# Patient Record
Sex: MALE | Race: WHITE | NOT HISPANIC OR LATINO | Employment: OTHER | URBAN - METROPOLITAN AREA
[De-identification: names, ages, dates, MRNs, and addresses within clinical notes are randomized per-mention and may not be internally consistent; named-entity substitution may affect disease eponyms.]

---

## 2023-03-04 PROBLEM — Z04.9 CONDITION NOT FOUND: Status: ACTIVE | Noted: 2023-03-04

## 2023-03-04 PROBLEM — M79.10 MYALGIA: Status: ACTIVE | Noted: 2023-03-04

## 2023-03-04 PROBLEM — M62.830 BACK MUSCLE SPASM: Status: ACTIVE | Noted: 2023-03-04

## 2023-03-04 PROBLEM — E16.2 HYPOGLYCEMIA: Status: ACTIVE | Noted: 2023-03-04

## 2023-03-04 PROBLEM — I10 HYPERTENSION: Status: ACTIVE | Noted: 2023-03-04

## 2023-03-04 PROBLEM — R79.89 ELEVATED TSH: Status: ACTIVE | Noted: 2023-03-04

## 2023-03-04 PROBLEM — G47.30 SLEEP APNEA: Status: ACTIVE | Noted: 2023-03-04

## 2023-03-04 PROBLEM — N30.90 CYSTITIS: Status: ACTIVE | Noted: 2023-03-04

## 2023-03-04 PROBLEM — E78.5 HYPERLIPIDEMIA: Status: ACTIVE | Noted: 2023-03-04

## 2023-03-04 PROBLEM — L25.5 RHUS DERMATITIS: Status: ACTIVE | Noted: 2023-03-04

## 2023-03-04 PROBLEM — R60.9 PAROTID SWELLING: Status: ACTIVE | Noted: 2023-03-04

## 2023-03-04 PROBLEM — R09.81 NASAL CONGESTION: Status: ACTIVE | Noted: 2023-03-04

## 2023-03-04 PROBLEM — R74.8 ELEVATED VITAMIN B12 LEVEL: Status: ACTIVE | Noted: 2023-03-04

## 2023-03-04 PROBLEM — E87.5 HYPERKALEMIA: Status: ACTIVE | Noted: 2023-03-04

## 2023-03-04 PROBLEM — R00.2 PALPITATIONS: Status: ACTIVE | Noted: 2023-03-04

## 2023-03-04 PROBLEM — N18.30 CKD STAGE 3 SECONDARY TO DIABETES (MULTI): Status: ACTIVE | Noted: 2023-03-04

## 2023-03-04 PROBLEM — R05.8 PRODUCTIVE COUGH: Status: ACTIVE | Noted: 2023-03-04

## 2023-03-04 PROBLEM — M25.579 JOINT PAIN, FOOT: Status: ACTIVE | Noted: 2023-03-04

## 2023-03-04 PROBLEM — G62.9 PERIPHERAL NEUROPATHY: Status: ACTIVE | Noted: 2023-03-04

## 2023-03-04 PROBLEM — R41.3 AMNESIA: Status: ACTIVE | Noted: 2023-03-04

## 2023-03-04 PROBLEM — M79.672 FOOT ARCH PAIN, LEFT: Status: ACTIVE | Noted: 2023-03-04

## 2023-03-04 PROBLEM — L84 PRE-ULCERATIVE CORN OR CALLOUS: Status: ACTIVE | Noted: 2023-03-04

## 2023-03-04 PROBLEM — E11.40 CONTROLLED DIABETES MELLITUS WITH DIABETIC NEUROPATHY (MULTI): Status: ACTIVE | Noted: 2023-03-04

## 2023-03-04 PROBLEM — E53.8 VITAMIN B12 DEFICIENCY: Status: ACTIVE | Noted: 2023-03-04

## 2023-03-04 PROBLEM — E11.22 CKD STAGE 3 SECONDARY TO DIABETES (MULTI): Status: ACTIVE | Noted: 2023-03-04

## 2023-03-04 PROBLEM — I34.0 MITRAL VALVE REGURGITATION: Status: ACTIVE | Noted: 2023-03-04

## 2023-03-04 PROBLEM — R05.9 COUGH: Status: ACTIVE | Noted: 2023-03-04

## 2023-03-04 PROBLEM — R09.89 BILATERAL CAROTID BRUITS: Status: ACTIVE | Noted: 2023-03-04

## 2023-03-04 PROBLEM — I73.9 PERIPHERAL VASCULAR DISEASE (CMS-HCC): Status: ACTIVE | Noted: 2023-03-04

## 2023-03-04 PROBLEM — R07.9 CHEST PAIN: Status: ACTIVE | Noted: 2023-03-04

## 2023-03-04 PROBLEM — N40.0 BPH WITHOUT URINARY OBSTRUCTION: Status: ACTIVE | Noted: 2023-03-04

## 2023-03-04 PROBLEM — S93.402A SPRAIN OF LEFT ANKLE: Status: ACTIVE | Noted: 2023-03-04

## 2023-03-04 PROBLEM — R10.9 ABDOMINAL PAIN OF MULTIPLE SITES: Status: ACTIVE | Noted: 2023-03-04

## 2023-03-04 PROBLEM — R06.00 DYSPNEA: Status: ACTIVE | Noted: 2023-03-04

## 2023-03-04 PROBLEM — R42 LIGHTHEADEDNESS: Status: ACTIVE | Noted: 2023-03-04

## 2023-03-04 PROBLEM — J90 PLEURAL EFFUSION: Status: ACTIVE | Noted: 2023-03-04

## 2023-03-04 PROBLEM — R04.0 EPISTAXIS: Status: ACTIVE | Noted: 2023-03-04

## 2023-03-04 PROBLEM — S39.012A LUMBAR STRAIN: Status: ACTIVE | Noted: 2023-03-04

## 2023-03-04 PROBLEM — I73.9 CLAUDICATION OF LOWER EXTREMITY (CMS-HCC): Status: ACTIVE | Noted: 2023-03-04

## 2023-03-04 PROBLEM — R79.89 ELEVATED VITAMIN B12 LEVEL: Status: ACTIVE | Noted: 2023-03-04

## 2023-03-04 PROBLEM — I25.10 CORONARY ARTERY DISEASE: Status: ACTIVE | Noted: 2023-03-04

## 2023-03-04 PROBLEM — G45.9 TIA (TRANSIENT ISCHEMIC ATTACK): Status: ACTIVE | Noted: 2023-03-04

## 2023-03-04 PROBLEM — R06.2 WHEEZING: Status: ACTIVE | Noted: 2023-03-04

## 2023-03-04 RX ORDER — BLOOD SUGAR DIAGNOSTIC
1 STRIP MISCELLANEOUS 2 TIMES DAILY
COMMUNITY
Start: 2017-09-08

## 2023-03-04 RX ORDER — ALBUTEROL SULFATE 90 UG/1
1-2 AEROSOL, METERED RESPIRATORY (INHALATION)
COMMUNITY
Start: 2021-04-09

## 2023-03-04 RX ORDER — METOPROLOL TARTRATE 25 MG/1
25 TABLET, FILM COATED ORAL 2 TIMES DAILY
COMMUNITY
Start: 2013-12-23 | End: 2023-10-06

## 2023-03-04 RX ORDER — ASPIRIN 81 MG/1
81 TABLET ORAL DAILY
COMMUNITY
Start: 2018-09-14

## 2023-03-04 RX ORDER — ATORVASTATIN CALCIUM 80 MG/1
80 TABLET, FILM COATED ORAL NIGHTLY
COMMUNITY
Start: 2016-03-16 | End: 2023-10-06

## 2023-03-04 RX ORDER — NITROGLYCERIN 0.4 MG/1
0.4 TABLET SUBLINGUAL
COMMUNITY
Start: 2016-03-30

## 2023-03-04 RX ORDER — PIOGLITAZONEHYDROCHLORIDE 30 MG/1
30 TABLET ORAL DAILY
COMMUNITY
Start: 2013-11-25 | End: 2024-01-15

## 2023-03-04 RX ORDER — TIZANIDINE 2 MG/1
2-4 TABLET ORAL EVERY 8 HOURS PRN
COMMUNITY
Start: 2021-06-02

## 2023-03-04 RX ORDER — CLOPIDOGREL BISULFATE 75 MG/1
75 TABLET ORAL DAILY
COMMUNITY
Start: 2015-09-10 | End: 2023-05-02

## 2023-03-04 RX ORDER — ACETAMINOPHEN 500 MG
100 TABLET ORAL
COMMUNITY
Start: 2020-07-07

## 2023-03-04 RX ORDER — MULTIVITAMIN
1 TABLET ORAL DAILY
COMMUNITY
Start: 2021-11-15

## 2023-03-04 RX ORDER — CILOSTAZOL 100 MG/1
100 TABLET ORAL 2 TIMES DAILY
COMMUNITY
Start: 2016-11-21

## 2023-03-04 RX ORDER — LOSARTAN POTASSIUM 50 MG/1
25 TABLET ORAL DAILY
COMMUNITY
Start: 2012-11-20 | End: 2024-03-04

## 2023-03-04 RX ORDER — FUROSEMIDE 40 MG/1
40 TABLET ORAL
COMMUNITY
Start: 2021-09-17

## 2023-03-04 RX ORDER — LANOLIN ALCOHOL/MO/W.PET/CERES
400 CREAM (GRAM) TOPICAL
COMMUNITY
Start: 2015-09-10

## 2023-03-10 ENCOUNTER — LAB (OUTPATIENT)
Dept: LAB | Facility: LAB | Age: 83
End: 2023-03-10
Payer: MEDICARE

## 2023-03-10 ENCOUNTER — OFFICE VISIT (OUTPATIENT)
Dept: PRIMARY CARE | Facility: CLINIC | Age: 83
End: 2023-03-10
Payer: MEDICARE

## 2023-03-10 VITALS
HEIGHT: 65 IN | BODY MASS INDEX: 29.99 KG/M2 | HEART RATE: 78 BPM | OXYGEN SATURATION: 94 % | TEMPERATURE: 97.3 F | SYSTOLIC BLOOD PRESSURE: 90 MMHG | DIASTOLIC BLOOD PRESSURE: 52 MMHG | WEIGHT: 180 LBS

## 2023-03-10 DIAGNOSIS — J90 PLEURAL EFFUSION: ICD-10-CM

## 2023-03-10 DIAGNOSIS — E11.22 CKD STAGE 3 SECONDARY TO DIABETES (MULTI): ICD-10-CM

## 2023-03-10 DIAGNOSIS — E11.40 CONTROLLED TYPE 2 DIABETES MELLITUS WITH DIABETIC NEUROPATHY, WITHOUT LONG-TERM CURRENT USE OF INSULIN (MULTI): ICD-10-CM

## 2023-03-10 DIAGNOSIS — I73.9 CLAUDICATION OF LOWER EXTREMITY (CMS-HCC): ICD-10-CM

## 2023-03-10 DIAGNOSIS — I73.9 PERIPHERAL VASCULAR DISEASE (CMS-HCC): ICD-10-CM

## 2023-03-10 DIAGNOSIS — N18.30 CKD STAGE 3 SECONDARY TO DIABETES (MULTI): ICD-10-CM

## 2023-03-10 DIAGNOSIS — E11.40 CONTROLLED TYPE 2 DIABETES MELLITUS WITH DIABETIC NEUROPATHY, WITHOUT LONG-TERM CURRENT USE OF INSULIN (MULTI): Primary | ICD-10-CM

## 2023-03-10 PROBLEM — R05.8 PRODUCTIVE COUGH: Status: RESOLVED | Noted: 2023-03-04 | Resolved: 2023-03-10

## 2023-03-10 PROBLEM — R04.0 EPISTAXIS: Status: RESOLVED | Noted: 2023-03-04 | Resolved: 2023-03-10

## 2023-03-10 PROBLEM — R06.2 WHEEZING: Status: RESOLVED | Noted: 2023-03-04 | Resolved: 2023-03-10

## 2023-03-10 PROCEDURE — 99214 OFFICE O/P EST MOD 30 MIN: CPT | Performed by: FAMILY MEDICINE

## 2023-03-10 PROCEDURE — 36415 COLL VENOUS BLD VENIPUNCTURE: CPT

## 2023-03-10 PROCEDURE — 84443 ASSAY THYROID STIM HORMONE: CPT

## 2023-03-10 PROCEDURE — 80053 COMPREHEN METABOLIC PANEL: CPT

## 2023-03-10 PROCEDURE — 1159F MED LIST DOCD IN RCRD: CPT | Performed by: FAMILY MEDICINE

## 2023-03-10 PROCEDURE — 83036 HEMOGLOBIN GLYCOSYLATED A1C: CPT

## 2023-03-10 PROCEDURE — 3074F SYST BP LT 130 MM HG: CPT | Performed by: FAMILY MEDICINE

## 2023-03-10 PROCEDURE — 1160F RVW MEDS BY RX/DR IN RCRD: CPT | Performed by: FAMILY MEDICINE

## 2023-03-10 PROCEDURE — 1036F TOBACCO NON-USER: CPT | Performed by: FAMILY MEDICINE

## 2023-03-10 PROCEDURE — 85025 COMPLETE CBC W/AUTO DIFF WBC: CPT

## 2023-03-10 PROCEDURE — 80061 LIPID PANEL: CPT

## 2023-03-10 PROCEDURE — 3078F DIAST BP <80 MM HG: CPT | Performed by: FAMILY MEDICINE

## 2023-03-10 ASSESSMENT — PATIENT HEALTH QUESTIONNAIRE - PHQ9
2. FEELING DOWN, DEPRESSED OR HOPELESS: NOT AT ALL
1. LITTLE INTEREST OR PLEASURE IN DOING THINGS: NOT AT ALL
SUM OF ALL RESPONSES TO PHQ9 QUESTIONS 1 AND 2: 0

## 2023-03-10 ASSESSMENT — ENCOUNTER SYMPTOMS
GASTROINTESTINAL NEGATIVE: 1
APNEA: 0
CHOKING: 0
FATIGUE: 0
CHEST TIGHTNESS: 0
OCCASIONAL FEELINGS OF UNSTEADINESS: 0
CHILLS: 0
COUGH: 1
DEPRESSION: 0
LOSS OF SENSATION IN FEET: 0
CARDIOVASCULAR NEGATIVE: 1

## 2023-03-10 NOTE — PATIENT INSTRUCTIONS
Evaluating for shortness of breath with exertion.  PA and lateral chest x-ray being performed CMP, CBC, lipids, TSH, hemoglobin A1c has been performed.    If any worsening symptoms please let me know.    May need to refer you back to pulmonary specialist regarding possible pleural effusion.    If any troubles with worsening of shortness of breath please let me know.    If any high fever or shaking chills or cough develop please let me know.    We will await the lab studies and let you know what this shows.    EKG performed and reviewed

## 2023-03-10 NOTE — PROGRESS NOTES
"Subjective   Patient ID: Nick Story is a 83 y.o. male who presents for Shortness of Breath.Had history of pleural effusion.  Now has Sob.  Dr. Bennett.  Patient having shortness of breath with activity.  Patient had significant history for pleural effusion.  Had a drain in his lung at 1 time.  About a year or 2 ago it was taken out because there was no further drainage.  He has noted some shortness of breath.  He does not have any paroxysmal nocturnal dyspnea.  He has had no rapid heart rate or slow heart rate.  There is been no swelling of the legs or feet.  Occasionally may have some lightheadedness.    He has had no fever no chills no night sweats.  No abdominal pain or discomfort.    Is had a little bit of cough no production to the cough.        HPI     Review of Systems   Constitutional:  Negative for chills and fatigue.   Respiratory:  Positive for cough. Negative for apnea, choking and chest tightness.    Cardiovascular: Negative.    Gastrointestinal: Negative.        Objective   BP 90/52   Pulse 78   Temp 36.3 °C (97.3 °F)   Ht 1.651 m (5' 5\")   Wt 81.6 kg (180 lb)   SpO2 94%   BMI 29.95 kg/m²     Physical Exam  Constitutional:       General: He is not in acute distress.     Appearance: Normal appearance. He is normal weight. He is not toxic-appearing.   HENT:      Head: Normocephalic.   Cardiovascular:      Rate and Rhythm: Normal rate and regular rhythm.      Pulses: Normal pulses.      Heart sounds: Normal heart sounds.      Comments: Grade 2/6 systolic ejection murmur.  Pulmonary:      Effort: Pulmonary effort is normal.      Breath sounds: Decreased air movement present.      Comments: Patient with decreased breath sounds in the right lung field.  Correction is decreased breath sounds noted in the left lung field.  Abdominal:      General: Abdomen is flat.   Musculoskeletal:         General: No swelling.      Cervical back: No rigidity.   Skin:     General: Skin is warm.   Neurological:      " General: No focal deficit present.      Mental Status: He is alert.      Cranial Nerves: No cranial nerve deficit.         Assessment/Plan   Problem List Items Addressed This Visit          Nervous    Controlled diabetes mellitus with diabetic neuropathy (CMS/HCC) - Primary     Evaluating for control blood sugar lab studies are being performed.            Respiratory    Pleural effusion     Concerned about pleural effusion PA and lateral chest x-ray going to be performed            Circulatory    Peripheral vascular disease (CMS/HCC)     Peripheral vascular disease has been stable.            Musculoskeletal    Claudication of lower extremity (CMS/[facility])     Claudication has been stable been walking without difficulty.            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

## 2023-03-11 LAB
ALANINE AMINOTRANSFERASE (SGPT) (U/L) IN SER/PLAS: 14 U/L (ref 10–52)
ALBUMIN (G/DL) IN SER/PLAS: 3.7 G/DL (ref 3.4–5)
ALKALINE PHOSPHATASE (U/L) IN SER/PLAS: 95 U/L (ref 33–136)
ANION GAP IN SER/PLAS: 11 MMOL/L (ref 10–20)
ASPARTATE AMINOTRANSFERASE (SGOT) (U/L) IN SER/PLAS: 28 U/L (ref 9–39)
BASOPHILS (10*3/UL) IN BLOOD BY AUTOMATED COUNT: 0.01 X10E9/L (ref 0–0.1)
BASOPHILS/100 LEUKOCYTES IN BLOOD BY AUTOMATED COUNT: 0.2 % (ref 0–2)
BILIRUBIN TOTAL (MG/DL) IN SER/PLAS: 1.5 MG/DL (ref 0–1.2)
CALCIUM (MG/DL) IN SER/PLAS: 8.9 MG/DL (ref 8.6–10.6)
CARBON DIOXIDE, TOTAL (MMOL/L) IN SER/PLAS: 28 MMOL/L (ref 21–32)
CHLORIDE (MMOL/L) IN SER/PLAS: 102 MMOL/L (ref 98–107)
CHOLESTEROL (MG/DL) IN SER/PLAS: 146 MG/DL (ref 0–199)
CHOLESTEROL IN HDL (MG/DL) IN SER/PLAS: 41.6 MG/DL
CHOLESTEROL/HDL RATIO: 3.5
CREATININE (MG/DL) IN SER/PLAS: 1.79 MG/DL (ref 0.5–1.3)
EOSINOPHILS (10*3/UL) IN BLOOD BY AUTOMATED COUNT: 0.03 X10E9/L (ref 0–0.4)
EOSINOPHILS/100 LEUKOCYTES IN BLOOD BY AUTOMATED COUNT: 0.5 % (ref 0–6)
ERYTHROCYTE DISTRIBUTION WIDTH (RATIO) BY AUTOMATED COUNT: 16.6 % (ref 11.5–14.5)
ERYTHROCYTE MEAN CORPUSCULAR HEMOGLOBIN CONCENTRATION (G/DL) BY AUTOMATED: 30.9 G/DL (ref 32–36)
ERYTHROCYTE MEAN CORPUSCULAR VOLUME (FL) BY AUTOMATED COUNT: 114 FL (ref 80–100)
ERYTHROCYTES (10*6/UL) IN BLOOD BY AUTOMATED COUNT: 3.19 X10E12/L (ref 4.5–5.9)
ESTIMATED AVERAGE GLUCOSE FOR HBA1C: 111 MG/DL
GFR MALE: 37 ML/MIN/1.73M2
GLUCOSE (MG/DL) IN SER/PLAS: 115 MG/DL (ref 74–99)
HEMATOCRIT (%) IN BLOOD BY AUTOMATED COUNT: 36.3 % (ref 41–52)
HEMOGLOBIN (G/DL) IN BLOOD: 11.2 G/DL (ref 13.5–17.5)
HEMOGLOBIN A1C/HEMOGLOBIN TOTAL IN BLOOD: 5.5 %
IMMATURE GRANULOCYTES/100 LEUKOCYTES IN BLOOD BY AUTOMATED COUNT: 0.3 % (ref 0–0.9)
LDL: 92 MG/DL (ref 0–99)
LEUKOCYTES (10*3/UL) IN BLOOD BY AUTOMATED COUNT: 6 X10E9/L (ref 4.4–11.3)
LYMPHOCYTES (10*3/UL) IN BLOOD BY AUTOMATED COUNT: 0.31 X10E9/L (ref 0.8–3)
LYMPHOCYTES/100 LEUKOCYTES IN BLOOD BY AUTOMATED COUNT: 5.2 % (ref 13–44)
MONOCYTES (10*3/UL) IN BLOOD BY AUTOMATED COUNT: 0.43 X10E9/L (ref 0.05–0.8)
MONOCYTES/100 LEUKOCYTES IN BLOOD BY AUTOMATED COUNT: 7.2 % (ref 2–10)
NEUTROPHILS (10*3/UL) IN BLOOD BY AUTOMATED COUNT: 5.18 X10E9/L (ref 1.6–5.5)
NEUTROPHILS/100 LEUKOCYTES IN BLOOD BY AUTOMATED COUNT: 86.6 % (ref 40–80)
NRBC (PER 100 WBCS) BY AUTOMATED COUNT: 0 /100 WBC (ref 0–0)
PLATELETS (10*3/UL) IN BLOOD AUTOMATED COUNT: 149 X10E9/L (ref 150–450)
POTASSIUM (MMOL/L) IN SER/PLAS: 5.2 MMOL/L (ref 3.5–5.3)
PROTEIN TOTAL: 7.2 G/DL (ref 6.4–8.2)
SODIUM (MMOL/L) IN SER/PLAS: 136 MMOL/L (ref 136–145)
THYROTROPIN (MIU/L) IN SER/PLAS BY DETECTION LIMIT <= 0.05 MIU/L: 3 MIU/L (ref 0.44–3.98)
TRIGLYCERIDE (MG/DL) IN SER/PLAS: 62 MG/DL (ref 0–149)
UREA NITROGEN (MG/DL) IN SER/PLAS: 30 MG/DL (ref 6–23)
VLDL: 12 MG/DL (ref 0–40)

## 2023-03-13 DIAGNOSIS — D64.9 LOW HEMOGLOBIN: Primary | ICD-10-CM

## 2023-03-14 ENCOUNTER — LAB (OUTPATIENT)
Dept: LAB | Facility: LAB | Age: 83
End: 2023-03-14
Payer: MEDICARE

## 2023-03-14 DIAGNOSIS — D64.9 LOW HEMOGLOBIN: ICD-10-CM

## 2023-03-14 LAB
ALANINE AMINOTRANSFERASE (SGPT) (U/L) IN SER/PLAS: NORMAL
ALBUMIN (G/DL) IN SER/PLAS: NORMAL
ALKALINE PHOSPHATASE (U/L) IN SER/PLAS: NORMAL
ANION GAP IN SER/PLAS: NORMAL
ASPARTATE AMINOTRANSFERASE (SGOT) (U/L) IN SER/PLAS: NORMAL
BASOPHILS (10*3/UL) IN BLOOD BY AUTOMATED COUNT: NORMAL
BASOPHILS/100 LEUKOCYTES IN BLOOD BY AUTOMATED COUNT: NORMAL
BILIRUBIN TOTAL (MG/DL) IN SER/PLAS: NORMAL
CALCIUM (MG/DL) IN SER/PLAS: NORMAL
CARBON DIOXIDE, TOTAL (MMOL/L) IN SER/PLAS: NORMAL
CHLORIDE (MMOL/L) IN SER/PLAS: NORMAL
CHOLESTEROL (MG/DL) IN SER/PLAS: NORMAL
CHOLESTEROL IN HDL (MG/DL) IN SER/PLAS: NORMAL
CHOLESTEROL/HDL RATIO: NORMAL
CREATININE (MG/DL) IN SER/PLAS: NORMAL
EOSINOPHILS (10*3/UL) IN BLOOD BY AUTOMATED COUNT: NORMAL
EOSINOPHILS/100 LEUKOCYTES IN BLOOD BY AUTOMATED COUNT: NORMAL
ERYTHROCYTE DISTRIBUTION WIDTH (RATIO) BY AUTOMATED COUNT: NORMAL
ERYTHROCYTE MEAN CORPUSCULAR HEMOGLOBIN CONCENTRATION (G/DL) BY AUTOMATED: NORMAL
ERYTHROCYTE MEAN CORPUSCULAR VOLUME (FL) BY AUTOMATED COUNT: NORMAL
ERYTHROCYTES (10*6/UL) IN BLOOD BY AUTOMATED COUNT: NORMAL
ESTIMATED AVERAGE GLUCOSE FOR HBA1C: NORMAL
GFR FEMALE: NORMAL
GFR MALE: NORMAL
GLUCOSE (MG/DL) IN SER/PLAS: NORMAL
HEMATOCRIT (%) IN BLOOD BY AUTOMATED COUNT: NORMAL
HEMOGLOBIN (G/DL) IN BLOOD: NORMAL
HEMOGLOBIN A1C/HEMOGLOBIN TOTAL IN BLOOD: NORMAL
HGB A1C-DATA CONVERSION: NORMAL %
IMMATURE GRANULOCYTES/100 LEUKOCYTES IN BLOOD BY AUTOMATED COUNT: NORMAL
LDL: NORMAL
LEUKOCYTES (10*3/UL) IN BLOOD BY AUTOMATED COUNT: NORMAL
LYMPHOCYTES (10*3/UL) IN BLOOD BY AUTOMATED COUNT: NORMAL
LYMPHOCYTES/100 LEUKOCYTES IN BLOOD BY AUTOMATED COUNT: NORMAL
MANUAL DIFFERENTIAL Y/N: NORMAL
MONOCYTES (10*3/UL) IN BLOOD BY AUTOMATED COUNT: NORMAL
MONOCYTES/100 LEUKOCYTES IN BLOOD BY AUTOMATED COUNT: NORMAL
NEUTROPHILS (10*3/UL) IN BLOOD BY AUTOMATED COUNT: NORMAL
NEUTROPHILS/100 LEUKOCYTES IN BLOOD BY AUTOMATED COUNT: NORMAL
NON HDL CHOLESTEROL: NORMAL
NRBC (PER 100 WBCS) BY AUTOMATED COUNT: NORMAL
PLATELETS (10*3/UL) IN BLOOD AUTOMATED COUNT: NORMAL
POTASSIUM (MMOL/L) IN SER/PLAS: NORMAL
PROTEIN TOTAL: NORMAL
SODIUM (MMOL/L) IN SER/PLAS: NORMAL
THYROTROPIN (MIU/L) IN SER/PLAS BY DETECTION LIMIT <= 0.05 MIU/L: NORMAL
TRIGLYCERIDE (MG/DL) IN SER/PLAS: NORMAL
UREA NITROGEN (MG/DL) IN SER/PLAS: NORMAL
VLDL: NORMAL

## 2023-03-14 PROCEDURE — 82728 ASSAY OF FERRITIN: CPT

## 2023-03-14 PROCEDURE — 36415 COLL VENOUS BLD VENIPUNCTURE: CPT

## 2023-03-14 PROCEDURE — 83540 ASSAY OF IRON: CPT

## 2023-03-14 PROCEDURE — 85045 AUTOMATED RETICULOCYTE COUNT: CPT

## 2023-03-14 PROCEDURE — 83550 IRON BINDING TEST: CPT

## 2023-03-14 PROCEDURE — 82607 VITAMIN B-12: CPT

## 2023-03-15 ENCOUNTER — TELEPHONE (OUTPATIENT)
Dept: PRIMARY CARE | Facility: CLINIC | Age: 83
End: 2023-03-15
Payer: MEDICARE

## 2023-03-15 LAB
COBALAMIN (VITAMIN B12) (PG/ML) IN SER/PLAS: 551 PG/ML (ref 211–911)
FERRITIN (UG/LL) IN SER/PLAS: 326 UG/L (ref 20–300)
HEMOGLOBIN (PG) IN RETICULOCYTES: 38 PG (ref 28–38)
IMMATURE RETIC FRACTION: 18.5 % (ref 0–16)
IRON (UG/DL) IN SER/PLAS: 109 UG/DL (ref 35–150)
IRON BINDING CAPACITY (UG/DL) IN SER/PLAS: 248 UG/DL (ref 240–445)
IRON SATURATION (%) IN SER/PLAS: 44 % (ref 25–45)
PROSTATE SPECIFIC ANTIGEN,SCREEN: 1.27 NG/ML (ref 0–4)
RETICULOCYTES (10*3/UL) IN BLOOD: 0.07 X10E12/L (ref 0.02–0.11)
RETICULOCYTES/100 ERYTHROCYTES IN BLOOD BY AUTOMATED COUNT: 2.1 % (ref 0.5–2)

## 2023-03-15 NOTE — TELEPHONE ENCOUNTER
Pt daughter asking what labs were low that pt had to have repeated and why the labs that were drawn Monday were canceled     Asking for a phone call

## 2023-03-15 NOTE — TELEPHONE ENCOUNTER
I did reach out to the daughter and left a message that her studies actually were performed.  The iron levels showed no evidence of iron deficiency.  B12 showed no evidence of deficiency.  Reticulocyte count is elevated which means it is working well.    The other areas that were canceled were not ordered.    Instructed to call back if any further questions.

## 2023-03-21 DIAGNOSIS — E11.40 CONTROLLED TYPE 2 DIABETES MELLITUS WITH DIABETIC NEUROPATHY, WITHOUT LONG-TERM CURRENT USE OF INSULIN (MULTI): ICD-10-CM

## 2023-03-30 ENCOUNTER — LAB (OUTPATIENT)
Dept: LAB | Facility: LAB | Age: 83
End: 2023-03-30
Payer: MEDICARE

## 2023-03-30 DIAGNOSIS — D64.9 LOW HEMOGLOBIN: ICD-10-CM

## 2023-03-30 LAB
BASOPHILS (10*3/UL) IN BLOOD BY AUTOMATED COUNT: 0.02 X10E9/L (ref 0–0.1)
BASOPHILS/100 LEUKOCYTES IN BLOOD BY AUTOMATED COUNT: 0.5 % (ref 0–2)
EOSINOPHILS (10*3/UL) IN BLOOD BY AUTOMATED COUNT: 0.07 X10E9/L (ref 0–0.4)
EOSINOPHILS/100 LEUKOCYTES IN BLOOD BY AUTOMATED COUNT: 1.6 % (ref 0–6)
ERYTHROCYTE DISTRIBUTION WIDTH (RATIO) BY AUTOMATED COUNT: 16.3 % (ref 11.5–14.5)
ERYTHROCYTE MEAN CORPUSCULAR HEMOGLOBIN CONCENTRATION (G/DL) BY AUTOMATED: 31.3 G/DL (ref 32–36)
ERYTHROCYTE MEAN CORPUSCULAR VOLUME (FL) BY AUTOMATED COUNT: 113 FL (ref 80–100)
ERYTHROCYTES (10*6/UL) IN BLOOD BY AUTOMATED COUNT: 3.25 X10E12/L (ref 4.5–5.9)
HEMATOCRIT (%) IN BLOOD BY AUTOMATED COUNT: 36.8 % (ref 41–52)
HEMOGLOBIN (G/DL) IN BLOOD: 11.5 G/DL (ref 13.5–17.5)
IMMATURE GRANULOCYTES/100 LEUKOCYTES IN BLOOD BY AUTOMATED COUNT: 0.7 % (ref 0–0.9)
LEUKOCYTES (10*3/UL) IN BLOOD BY AUTOMATED COUNT: 4.4 X10E9/L (ref 4.4–11.3)
LYMPHOCYTES (10*3/UL) IN BLOOD BY AUTOMATED COUNT: 0.56 X10E9/L (ref 0.8–3)
LYMPHOCYTES/100 LEUKOCYTES IN BLOOD BY AUTOMATED COUNT: 12.8 % (ref 13–44)
MONOCYTES (10*3/UL) IN BLOOD BY AUTOMATED COUNT: 0.43 X10E9/L (ref 0.05–0.8)
MONOCYTES/100 LEUKOCYTES IN BLOOD BY AUTOMATED COUNT: 9.8 % (ref 2–10)
NEUTROPHILS (10*3/UL) IN BLOOD BY AUTOMATED COUNT: 3.28 X10E9/L (ref 1.6–5.5)
NEUTROPHILS/100 LEUKOCYTES IN BLOOD BY AUTOMATED COUNT: 74.6 % (ref 40–80)
NRBC (PER 100 WBCS) BY AUTOMATED COUNT: 0 /100 WBC (ref 0–0)
PLATELETS (10*3/UL) IN BLOOD AUTOMATED COUNT: 126 X10E9/L (ref 150–450)

## 2023-03-30 PROCEDURE — 85025 COMPLETE CBC W/AUTO DIFF WBC: CPT

## 2023-03-30 PROCEDURE — 36415 COLL VENOUS BLD VENIPUNCTURE: CPT

## 2023-04-03 ENCOUNTER — OFFICE VISIT (OUTPATIENT)
Dept: PRIMARY CARE | Facility: CLINIC | Age: 83
End: 2023-04-03
Payer: MEDICARE

## 2023-04-03 VITALS
HEART RATE: 76 BPM | DIASTOLIC BLOOD PRESSURE: 68 MMHG | OXYGEN SATURATION: 97 % | TEMPERATURE: 97.5 F | HEIGHT: 65 IN | WEIGHT: 180 LBS | BODY MASS INDEX: 29.99 KG/M2 | SYSTOLIC BLOOD PRESSURE: 120 MMHG

## 2023-04-03 DIAGNOSIS — Z00.00 ROUTINE GENERAL MEDICAL EXAMINATION AT HEALTH CARE FACILITY: ICD-10-CM

## 2023-04-03 DIAGNOSIS — D64.9 ANEMIA, UNSPECIFIED TYPE: ICD-10-CM

## 2023-04-03 DIAGNOSIS — J90 PLEURAL EFFUSION: ICD-10-CM

## 2023-04-03 DIAGNOSIS — I25.10 CORONARY ARTERY DISEASE INVOLVING NATIVE HEART WITHOUT ANGINA PECTORIS, UNSPECIFIED VESSEL OR LESION TYPE: ICD-10-CM

## 2023-04-03 DIAGNOSIS — Z00.00 ENCOUNTER FOR ANNUAL WELLNESS VISIT (AWV) IN MEDICARE PATIENT: Primary | ICD-10-CM

## 2023-04-03 PROCEDURE — 1160F RVW MEDS BY RX/DR IN RCRD: CPT | Performed by: FAMILY MEDICINE

## 2023-04-03 PROCEDURE — 1159F MED LIST DOCD IN RCRD: CPT | Performed by: FAMILY MEDICINE

## 2023-04-03 PROCEDURE — G0439 PPPS, SUBSEQ VISIT: HCPCS | Performed by: FAMILY MEDICINE

## 2023-04-03 PROCEDURE — 1170F FXNL STATUS ASSESSED: CPT | Performed by: FAMILY MEDICINE

## 2023-04-03 PROCEDURE — 99214 OFFICE O/P EST MOD 30 MIN: CPT | Performed by: FAMILY MEDICINE

## 2023-04-03 PROCEDURE — 1036F TOBACCO NON-USER: CPT | Performed by: FAMILY MEDICINE

## 2023-04-03 PROCEDURE — 3078F DIAST BP <80 MM HG: CPT | Performed by: FAMILY MEDICINE

## 2023-04-03 PROCEDURE — 3074F SYST BP LT 130 MM HG: CPT | Performed by: FAMILY MEDICINE

## 2023-04-03 ASSESSMENT — ENCOUNTER SYMPTOMS
EYE DISCHARGE: 0
NAUSEA: 0
BACK PAIN: 0
DYSURIA: 0
WHEEZING: 0
BLOOD IN STOOL: 0
COUGH: 1
DIZZINESS: 0
EYE PAIN: 0
CONSTIPATION: 0
ABDOMINAL DISTENTION: 0
CONSTITUTIONAL NEGATIVE: 1
DEPRESSION: 0
ABDOMINAL PAIN: 0
COLOR CHANGE: 0
CHEST TIGHTNESS: 0
ARTHRALGIAS: 0
POLYPHAGIA: 0
AGITATION: 0
OCCASIONAL FEELINGS OF UNSTEADINESS: 0
FLANK PAIN: 0
SHORTNESS OF BREATH: 1
LIGHT-HEADEDNESS: 0
LOSS OF SENSATION IN FEET: 0
POLYDIPSIA: 0
RECTAL PAIN: 0

## 2023-04-03 ASSESSMENT — ACTIVITIES OF DAILY LIVING (ADL)
BATHING: INDEPENDENT
DRESSING: INDEPENDENT
GROCERY_SHOPPING: INDEPENDENT
DOING_HOUSEWORK: INDEPENDENT
MANAGING_FINANCES: INDEPENDENT
TAKING_MEDICATION: INDEPENDENT

## 2023-04-03 NOTE — PATIENT INSTRUCTIONS
Overall stable.  Going to have you see nephrology regarding elevation of kidney function test.    Going to have you see hematology regarding anemia.    Please continue to follow-up with vascular specialist as noted.    Reviewed labs with you today medications reviewed and reconciled.

## 2023-04-03 NOTE — PROGRESS NOTES
Subjective   Patient ID: Nick Story is a 83 y.o. male who presents for Medicare Annual Wellness Visit Subsequent (physical).    HPI patient presents for follow-up.  Patient has shortness of breath with exertion.  Patient has met with thoracic specialist.  They felt that his effusion was stable.  He did not want to do thoracentesis at this time but may need to do so.  They did want to do evaluation of the heart valves and they did order an echocardiogram.    He will be following up with family and has follow-up with echo.  Blood counts showed significant macrocytosis B12 level is normal.  Anemia was appreciated and thrombocytopenia was noted.    He had no troubles with bleeding no blood in the stool or black tarry stool.  He has had no troubles with abdominal pain or discomfort.  He is eating and drinking without difficulty.  Shortness of breath remains from his pleural effusion.  PSA level was normal    Seeing seeing kidney specialist, patient noted to have anemia.  Iron and B12 levels are normal.  His denies any blood in the stool or black tarry stool.  Otherwise has been doing well states breathing is about the same.  Jorge did meet with the pulmonary or thoracic specialist who did not recommend doing thoracentesis at this time.  Felt that things were stable.  They have ordered echocardiogram to be performed.    He has had no troubles with nausea or vomiting.  No numbness no tingling the legs or feet.    Alcohol intake: none  Caffeine intake: none  Exercise: none    Last Colonoscopy: not recent  Last Pap smear: n/A  Mammogram:  Last Dexa scan:N/a    Shingles vaccine: None  TdaP vaccine:     Review of Systems   Constitutional: Negative.    HENT: Negative.     Eyes:  Negative for photophobia, pain and discharge.   Respiratory:  Positive for cough and shortness of breath. Negative for chest tightness and wheezing.    Cardiovascular:  Negative for chest pain.   Gastrointestinal:  Negative for abdominal  "distention, abdominal pain, blood in stool, constipation, nausea and rectal pain.   Endocrine: Positive for cold intolerance. Negative for polydipsia and polyphagia.   Genitourinary:  Negative for dysuria, flank pain, genital sores and urgency.   Musculoskeletal:  Negative for arthralgias and back pain.   Skin:  Negative for color change and rash.   Allergic/Immunologic: Negative for food allergies.   Neurological:  Negative for dizziness, syncope and light-headedness.   Psychiatric/Behavioral:  Negative for agitation and decreased concentration.        Objective   /68   Pulse 76   Temp 36.4 °C (97.5 °F)   Ht 1.651 m (5' 5\")   Wt 81.6 kg (180 lb)   SpO2 97%   BMI 29.95 kg/m²   BSA Body surface area is 1.93 meters squared.      Physical Exam  Constitutional:       Appearance: Normal appearance. He is obese.   HENT:      Head: Normocephalic and atraumatic.      Right Ear: Tympanic membrane normal.      Left Ear: Tympanic membrane normal.      Nose: Nose normal.      Mouth/Throat:      Mouth: Mucous membranes are dry.   Eyes:      Pupils: Pupils are equal, round, and reactive to light.   Cardiovascular:      Rate and Rhythm: Normal rate.      Heart sounds: Murmur heard.      Comments: Grade 2/6 systolic ejection murmur  Pulmonary:      Effort: Pulmonary effort is normal.      Comments: Decreased breath sounds in the bases of the lung  Abdominal:      General: Abdomen is flat. There is no distension.      Palpations: There is no mass.   Genitourinary:     Penis: Normal.       Testes: Normal.   Musculoskeletal:         General: Normal range of motion.      Cervical back: Normal range of motion.   Neurological:      General: No focal deficit present.      Mental Status: He is alert.      Cranial Nerves: No cranial nerve deficit.   Psychiatric:         Mood and Affect: Mood normal.       Lab on 03/30/2023   Component Date Value Ref Range Status    WBC 03/30/2023 4.4  4.4 - 11.3 x10E9/L Final    nRBC 03/30/2023 " 0.0  0.0 - 0.0 /100 WBC Final    RBC 03/30/2023 3.25 (L)  4.50 - 5.90 x10E12/L Final    Hemoglobin 03/30/2023 11.5 (L)  13.5 - 17.5 g/dL Final    Hematocrit 03/30/2023 36.8 (L)  41.0 - 52.0 % Final    MCV 03/30/2023 113 (H)  80 - 100 fL Final    MCHC 03/30/2023 31.3 (L)  32.0 - 36.0 g/dL Final    Platelets 03/30/2023 126 (L)  150 - 450 x10E9/L Final    RDW 03/30/2023 16.3 (H)  11.5 - 14.5 % Final    Neutrophils % 03/30/2023 74.6  40.0 - 80.0 % Final    Immature Granulocytes %, Automated 03/30/2023 0.7  0.0 - 0.9 % Final     Immature Granulocyte Count (IG) includes promyelocytes,    myelocytes and metamyelocytes but does not include bands.   Percent differential counts (%) should be interpreted in the   context of the absolute cell counts (cells/L).    Lymphocytes % 03/30/2023 12.8  13.0 - 44.0 % Final    Monocytes % 03/30/2023 9.8  2.0 - 10.0 % Final    Eosinophils % 03/30/2023 1.6  0.0 - 6.0 % Final    Basophils % 03/30/2023 0.5  0.0 - 2.0 % Final    Neutrophils Absolute 03/30/2023 3.28  1.60 - 5.50 x10E9/L Final    Lymphocytes Absolute 03/30/2023 0.56 (L)  0.80 - 3.00 x10E9/L Final    Monocytes Absolute 03/30/2023 0.43  0.05 - 0.80 x10E9/L Final    Eosinophils Absolute 03/30/2023 0.07  0.00 - 0.40 x10E9/L Final    Basophils Absolute 03/30/2023 0.02  0.00 - 0.10 x10E9/L Final   Orders Only on 03/14/2023   Component Date Value Ref Range Status    Prostate Specific Antigen,Screen 03/14/2023 1.27  0.00 - 4.00 ng/mL Final    Comment: The FDA requires that the method used for PSA assay be   reported to the physician. Values obtained with different   assay methods must not be used interchangeably. This test   was performed at [facility] using the Siemens  AdMobilize PSA method, which is a sandwich immunoassay using   chemiluminescence for quantitation. The assay is approved  for measurement of prostate-specific antigen (PSA) in   serum and may be used in conjunction with a digital rectal  examination in  men 50 years and older as an aid in   detection of prostate cancer.   5-Alpha-reductase inhibitors (e.g. Proscar, Finasteride,   Avodart, Dutasteride and Altagracia) for the treatment of BPH   have been shown to lower PSA levels by an average of 50%   after 6 months of treatment.     Orders Only on 03/14/2023   Component Date Value Ref Range Status    Cholesterol 03/14/2023 CANCELED   Final-Edited    Comment: .      AGE      DESIRABLE   BORDERLINE HIGH   HIGH     0-19 Y     0 - 169       170 - 199     >/= 200    20-24 Y     0 - 189       190 - 224     >/= 225         >24 Y     0 - 199       200 - 239     >/= 240   **All ranges are based on fasting samples. Specific   therapeutic targets will vary based on patient-specific   cardiac risk.  .   Pediatric guidelines reference:Pediatrics 2011, 128(S5).   Adult guidelines reference: NCEP ATPIII Guidelines,     LAUREANO 2001, 258:7126-97  .   Venipuncture immediately after or during the    administration of Metamizole may lead to falsely   low results. Testing should be performed immediately   prior to Metamizole dosing.    Result canceled by the ancillary.      HDL 03/14/2023 CANCELED   Final-Edited    Comment: .      AGE      VERY LOW   LOW     NORMAL    HIGH       0-19 Y       < 35   < 40     40-45     ----    20-24 Y       ----   < 40       >45     ----      >24 Y       ----   < 40     40-60      >60  .    Result canceled by the ancillary.      Cholesterol/HDL Ratio 03/14/2023 CANCELED   Final-Edited    Result canceled by the ancillary.    LDL 03/14/2023 CANCELED   Final-Edited    Comment: .                           NEAR      BORD      AGE      DESIRABLE  OPTIMAL    HIGH     HIGH     VERY HIGH     0-19 Y     0 - 109     ---    110-129   >/= 130     ----    20-24 Y     0 - 119     ---    120-159   >/= 160     ----      >24 Y     0 -  99   100-129  130-159   160-189     >/=190  .    Result canceled by the ancillary.      VLDL 03/14/2023 CANCELED   Final-Edited    Result  canceled by the ancillary.    Triglycerides 03/14/2023 CANCELED   Final-Edited    Comment: .      AGE      DESIRABLE   BORDERLINE HIGH   HIGH     VERY HIGH   0 D-90 D    19 - 174         ----         ----        ----  91 D- 9 Y     0 -  74        75 -  99     >/= 100      ----    10-19 Y     0 -  89        90 - 129     >/= 130      ----    20-24 Y     0 - 114       115 - 149     >/= 150      ----         >24 Y     0 - 149       150 - 199    200- 499    >/= 500  .   Venipuncture immediately after or during the    administration of Metamizole may lead to falsely   low results. Testing should be performed immediately   prior to Metamizole dosing.    Result canceled by the ancillary.      Non HDL Cholesterol 03/14/2023 CANCELED   Final-Edited    Comment:     AGE      DESIRABLE   BORDERLINE HIGH   HIGH     VERY HIGH     0-19 Y     0 - 119       120 - 144     >/= 145    >/= 160    20-24 Y     0 - 149       150 - 189     >/= 190      ----         >24 Y    30 MG/DL ABOVE LDL CHOLESTEROL GOAL  .    Result canceled by the ancillary.     Orders Only on 03/14/2023   Component Date Value Ref Range Status    TSH 03/14/2023 CANCELED   Final-Edited    Comment:  TSH testing is performed using different testing    methodology at [facility] than at other    [facility]. Direct result comparisons should    only be made within the same method.    Result canceled by the ancillary.     Orders Only on 03/14/2023   Component Date Value Ref Range Status    Glucose 03/14/2023 CANCELED   Final-Edited    Result canceled by the ancillary.    Sodium 03/14/2023 CANCELED   Final-Edited    Result canceled by the ancillary.    Potassium 03/14/2023 CANCELED   Final-Edited    Result canceled by the ancillary.    Chloride 03/14/2023 CANCELED   Final-Edited    Result canceled by the ancillary.    Bicarbonate 03/14/2023 CANCELED   Final-Edited    Result canceled by the ancillary.    Anion Gap 03/14/2023 CANCELED   Final-Edited    Result  canceled by the ancillary.    Urea Nitrogen 03/14/2023 CANCELED   Final-Edited    Result canceled by the ancillary.    Creatinine 03/14/2023 CANCELED   Final-Edited    Result canceled by the ancillary.    GFR Female 03/14/2023 CANCELED   Final-Edited    Comment:  CALCULATIONS OF ESTIMATED GFR ARE PERFORMED   USING THE 2021 CKD-EPI STUDY REFIT EQUATION   WITHOUT THE RACE VARIABLE FOR THE IDMS-TRACEABLE   CREATININE METHODS.    https://jasn.asnjournals.org/content/early/2021/09/22/ASN.[email or web address]    Result canceled by the ancillary.      GFR MALE 03/14/2023 CANCELED   Final-Edited    Comment:  CALCULATIONS OF ESTIMATED GFR ARE PERFORMED   USING THE 2021 CKD-EPI STUDY REFIT EQUATION   WITHOUT THE RACE VARIABLE FOR THE IDMS-TRACEABLE   CREATININE METHODS.    https://jasn.asnjournals.org/content/early/2021/09/22/ASN.[email or web address]    Result canceled by the ancillary.      Calcium 03/14/2023 CANCELED   Final-Edited    Result canceled by the ancillary.    Albumin 03/14/2023 CANCELED   Final-Edited    Result canceled by the ancillary.    Alkaline Phosphatase 03/14/2023 CANCELED   Final-Edited    Result canceled by the ancillary.    Total Protein 03/14/2023 CANCELED   Final-Edited    Result canceled by the ancillary.    AST 03/14/2023 CANCELED   Final-Edited    Result canceled by the ancillary.    Total Bilirubin 03/14/2023 CANCELED   Final-Edited    Result canceled by the ancillary.    ALT (SGPT) 03/14/2023 CANCELED   Final-Edited    Comment:  Patients treated with Sulfasalazine may generate    falsely decreased results for ALT.    Result canceled by the ancillary.     Orders Only on 03/14/2023   Component Date Value Ref Range Status    Hemoglobin A1C 03/14/2023 CANCELED   Final-Edited    Comment:      Diagnosis of Diabetes-Adults   Non-Diabetic: < or = 5.6%   Increased risk for developing diabetes: 5.7-6.4%   Diagnostic of diabetes: > or = 6.5%  .       Monitoring of Diabetes                Age (y)     Therapeutic Goal (%)    Adults:          >18           <7.0   Pediatrics:    13-18           <7.5                   7-12           <8.0                   0- 6            7.5-8.5   American Diabetes Association. Diabetes Care 33(S1), Jan 2010.    Result canceled by the ancillary.      Estimated Average Glucose 03/14/2023 CANCELED   Final-Edited    Result canceled by the ancillary.    HGB A1C 03/14/2023 CANCELED  % Final-Edited    Comment:       Diagnosis of Diabetes-Adults   Non-Diabetic: < or = 5.6%   Increased risk for developing diabetes: 5.7-6.4%   Diagnostic of diabetes: > or = 6.5%  .        Monitoring of Diabetes                Age (y)     Therapeutic Goal (%)   Adults:          >18           <7.0   Pediatrics:    13-18           <7.5                   7-12           <8.0                   0- 6            7.5-8.5   American Diabetes Association. Diabetes Care 33(S1), Jan 2010.    Result canceled by the ancillary.     Orders Only on 03/14/2023   Component Date Value Ref Range Status    WBC 03/14/2023 CANCELED   Final-Edited    Result canceled by the ancillary.    nRBC 03/14/2023 CANCELED   Final-Edited    Result canceled by the ancillary.    RBC 03/14/2023 CANCELED   Final-Edited    Result canceled by the ancillary.    Hemoglobin 03/14/2023 CANCELED   Final-Edited    Result canceled by the ancillary.    Hematocrit 03/14/2023 CANCELED   Final-Edited    Result canceled by the ancillary.    MCV 03/14/2023 CANCELED   Final-Edited    Result canceled by the ancillary.    MCHC 03/14/2023 CANCELED   Final-Edited    Result canceled by the ancillary.    Platelets 03/14/2023 CANCELED   Final-Edited    Result canceled by the ancillary.    RDW 03/14/2023 CANCELED   Final-Edited    Result canceled by the ancillary.    Neutrophils % 03/14/2023 CANCELED   Final-Edited    Result canceled by the ancillary.    Immature Granulocytes %, Automated 03/14/2023 CANCELED   Final-Edited    Comment:  Immature Granulocyte Count (IG) includes promyelocytes,     myelocytes and metamyelocytes but does not include bands.   Percent differential counts (%) should be interpreted in the   context of the absolute cell counts (cells/L).    Result canceled by the ancillary.      Lymphocytes % 03/14/2023 CANCELED   Final-Edited    Result canceled by the ancillary.    Monocytes % 03/14/2023 CANCELED   Final-Edited    Result canceled by the ancillary.    Eosinophils % 03/14/2023 CANCELED   Final-Edited    Result canceled by the ancillary.    Basophils % 03/14/2023 CANCELED   Final-Edited    Result canceled by the ancillary.    Neutrophils Absolute 03/14/2023 CANCELED   Final-Edited    Result canceled by the ancillary.    Lymphocytes Absolute 03/14/2023 CANCELED   Final-Edited    Result canceled by the ancillary.    Monocytes Absolute 03/14/2023 CANCELED   Final-Edited    Result canceled by the ancillary.    Eosinophils Absolute 03/14/2023 CANCELED   Final-Edited    Result canceled by the ancillary.    Basophils Absolute 03/14/2023 CANCELED   Final-Edited    Result canceled by the ancillary.    MANUAL DIFFERENTIAL Y/N 03/14/2023 CANCELED   Final-Edited    Result canceled by the ancillary.   Lab on 03/14/2023   Component Date Value Ref Range Status    Iron 03/14/2023 109  35 - 150 ug/dL Final    TIBC 03/14/2023 248  240 - 445 ug/dL Final    Iron Saturation 03/14/2023 44  25 - 45 % Final    Ferritin 03/14/2023 326 (H)  20 - 300 ug/L Final    Retic % 03/14/2023 2.1 (H)  0.5 - 2.0 % Final    Retic Absolute 03/14/2023 0.075  0.017 - 0.110 x10E12/L Final    Immature Retic fraction 03/14/2023 18.5 (H)  0.0 - 16.0 % Final    Reticulocyte Hemoglobin 03/14/2023 38  28 - 38 pg Final    Vitamin B-12 03/14/2023 551  211 - 911 pg/mL Final   Lab on 03/10/2023   Component Date Value Ref Range Status    WBC 03/10/2023 6.0  4.4 - 11.3 x10E9/L Final    nRBC 03/10/2023 0.0  0.0 - 0.0 /100 WBC Final    RBC 03/10/2023 3.19 (L)  4.50 - 5.90 x10E12/L Final    Hemoglobin 03/10/2023 11.2 (L)  13.5 - 17.5 g/dL  Final    Hematocrit 03/10/2023 36.3 (L)  41.0 - 52.0 % Final    MCV 03/10/2023 114 (H)  80 - 100 fL Final    MCHC 03/10/2023 30.9 (L)  32.0 - 36.0 g/dL Final    Platelets 03/10/2023 149 (L)  150 - 450 x10E9/L Final    RDW 03/10/2023 16.6 (H)  11.5 - 14.5 % Final    Neutrophils % 03/10/2023 86.6  40.0 - 80.0 % Final    Immature Granulocytes %, Automated 03/10/2023 0.3  0.0 - 0.9 % Final     Immature Granulocyte Count (IG) includes promyelocytes,    myelocytes and metamyelocytes but does not include bands.   Percent differential counts (%) should be interpreted in the   context of the absolute cell counts (cells/L).    Lymphocytes % 03/10/2023 5.2  13.0 - 44.0 % Final    Monocytes % 03/10/2023 7.2  2.0 - 10.0 % Final    Eosinophils % 03/10/2023 0.5  0.0 - 6.0 % Final    Basophils % 03/10/2023 0.2  0.0 - 2.0 % Final    Neutrophils Absolute 03/10/2023 5.18  1.60 - 5.50 x10E9/L Final    Lymphocytes Absolute 03/10/2023 0.31 (L)  0.80 - 3.00 x10E9/L Final    Monocytes Absolute 03/10/2023 0.43  0.05 - 0.80 x10E9/L Final    Eosinophils Absolute 03/10/2023 0.03  0.00 - 0.40 x10E9/L Final    Basophils Absolute 03/10/2023 0.01  0.00 - 0.10 x10E9/L Final    Hemoglobin A1C 03/10/2023 5.5  % Final         Diagnosis of Diabetes-Adults   Non-Diabetic: < or = 5.6%   Increased risk for developing diabetes: 5.7-6.4%   Diagnostic of diabetes: > or = 6.5%  .       Monitoring of Diabetes                Age (y)     Therapeutic Goal (%)   Adults:          >18           <7.0   Pediatrics:    13-18           <7.5                   7-12           <8.0                   0- 6            7.5-8.5   American Diabetes Association. Diabetes Care 33(S1), Jan 2010.    Estimated Average Glucose 03/10/2023 111  MG/DL Final    TSH 03/10/2023 3.00  0.44 - 3.98 mIU/L Final     TSH testing is performed using different testing    methodology at [facility] than at other    [facility] hospitals. Direct result comparisons should    only be made within  the same method.    Glucose 03/10/2023 115 (H)  74 - 99 mg/dL Final    Sodium 03/10/2023 136  136 - 145 mmol/L Final    Potassium 03/10/2023 5.2  3.5 - 5.3 mmol/L Final    Chloride 03/10/2023 102  98 - 107 mmol/L Final    Bicarbonate 03/10/2023 28  21 - 32 mmol/L Final    Anion Gap 03/10/2023 11  10 - 20 mmol/L Final    Urea Nitrogen 03/10/2023 30 (H)  6 - 23 mg/dL Final    Creatinine 03/10/2023 1.79 (H)  0.50 - 1.30 mg/dL Final    GFR MALE 03/10/2023 37 (A)  >90 mL/min/1.73m2 Final     CALCULATIONS OF ESTIMATED GFR ARE PERFORMED   USING THE 2021 CKD-EPI STUDY REFIT EQUATION   WITHOUT THE RACE VARIABLE FOR THE IDMS-TRACEABLE   CREATININE METHODS.    https://jasn.asnjournals.org/content/early/2021/09/22/ASN.3848056838    Calcium 03/10/2023 8.9  8.6 - 10.6 mg/dL Final    Albumin 03/10/2023 3.7  3.4 - 5.0 g/dL Final    Alkaline Phosphatase 03/10/2023 95  33 - 136 U/L Final    Total Protein 03/10/2023 7.2  6.4 - 8.2 g/dL Final    AST 03/10/2023 28  9 - 39 U/L Final    Total Bilirubin 03/10/2023 1.5 (H)  0.0 - 1.2 mg/dL Final    ALT (SGPT) 03/10/2023 14  10 - 52 U/L Final     Patients treated with Sulfasalazine may generate    falsely decreased results for ALT.    Cholesterol 03/10/2023 146  0 - 199 mg/dL Final    .      AGE      DESIRABLE   BORDERLINE HIGH   HIGH     0-19 Y     0 - 169       170 - 199     >/= 200    20-24 Y     0 - 189       190 - 224     >/= 225         >24 Y     0 - 199       200 - 239     >/= 240   **All ranges are based on fasting samples. Specific   therapeutic targets will vary based on patient-specific   cardiac risk.  .   Pediatric guidelines reference:Pediatrics 2011, 128(S5).   Adult guidelines reference: NCEP ATPIII Guidelines,     LAUREANO 2001, 258:2486-97  .   Venipuncture immediately after or during the    administration of Metamizole may lead to falsely   low results. Testing should be performed immediately   prior to Metamizole dosing.    HDL 03/10/2023 41.6  mg/dL Final    .      AGE       VERY LOW   LOW     NORMAL    HIGH       0-19 Y       < 35   < 40     40-45     ----    20-24 Y       ----   < 40       >45     ----      >24 Y       ----   < 40     40-60      >60  .    Cholesterol/HDL Ratio 03/10/2023 3.5   Final    REF VALUES  DESIRABLE  < 3.4  HIGH RISK  > 5.0    LDL 03/10/2023 92  0 - 99 mg/dL Final    .                           NEAR      BORD      AGE      DESIRABLE  OPTIMAL    HIGH     HIGH     VERY HIGH     0-19 Y     0 - 109     ---    110-129   >/= 130     ----    20-24 Y     0 - 119     ---    120-159   >/= 160     ----      >24 Y     0 -  99   100-129  130-159   160-189     >/=190  .    VLDL 03/10/2023 12  0 - 40 mg/dL Final    Triglycerides 03/10/2023 62  0 - 149 mg/dL Final    .      AGE      DESIRABLE   BORDERLINE HIGH   HIGH     VERY HIGH   0 D-90 D    19 - 174         ----         ----        ----  91 D- 9 Y     0 -  74        75 -  99     >/= 100      ----    10-19 Y     0 -  89        90 - 129     >/= 130      ----    20-24 Y     0 - 114       115 - 149     >/= 150      ----         >24 Y     0 - 149       150 - 199    200- 499    >/= 500  .   Venipuncture immediately after or during the    administration of Metamizole may lead to falsely   low results. Testing should be performed immediately   prior to Metamizole dosing.     Current Outpatient Medications on File Prior to Visit   Medication Sig Dispense Refill    aspirin 81 mg EC tablet Take 1 tablet (81 mg) by mouth once daily.      atorvastatin (Lipitor) 80 mg tablet Take 1 tablet (80 mg) by mouth once daily at bedtime.      cholecalciferol (Vitamin D-3) 50 mcg (2,000 unit) capsule Take 2 capsules (100 mcg) by mouth. TAKE 2 CAPSULE Once      cilostazol (Pletal) 100 mg tablet Take 1 tablet (100 mg) by mouth in the morning and 1 tablet (100 mg) before bedtime.      clopidogrel (Plavix) 75 mg tablet Take 1 tablet (75 mg) by mouth once daily.      losartan (Cozaar) 50 mg tablet Take 0.5 tablets (25 mg) by mouth once daily.       magnesium oxide (Mag-Ox) 400 mg (241.3 mg magnesium) tablet Take 1 tablet (400 mg) by mouth.      metoprolol tartrate (Lopressor) 25 mg tablet Take 1 tablet (25 mg) by mouth in the morning and 1 tablet (25 mg) before bedtime.      multivitamin tablet Take 1 tablet by mouth once daily.      nitroglycerin (Nitrostat) 0.4 mg SL tablet Place 1 tablet (0.4 mg) under the tongue. DISSOLVE 1 TABLET UNDER THE TONGUE AS NEEDED FOR ANGINA      OneTouch Ultra Test strip 1 strip  in the morning and 1 strip before bedtime. TEST TWO TIMES A DAY.      pioglitazone (Actos) 30 mg tablet Take 1 tablet (30 mg) by mouth once daily.      albuterol 90 mcg/actuation inhaler Inhale 1-2 puffs. INHALE 1 TO 2 PUFFS EVERY 4 TO 6 HOURS AS NEEDED.      furosemide (Lasix) 40 mg tablet Take 1 tablet (40 mg) by mouth once daily in the morning. Take before meals.      tiZANidine (Zanaflex) 2 mg tablet Take 1-2 tablets (2-4 mg) by mouth every 8 hours if needed for muscle spasms.       No current facility-administered medications on file prior to visit.     No images are attached to the encounter.            Assessment/Plan   Problem List Items Addressed This Visit          Respiratory    Pleural effusion     Please continue to follow-up with thoracic specialist regarding pleural effusion            Circulatory    Coronary artery disease     Doing well            Hematologic    Anemia       Other    Encounter for annual wellness visit (AWV) in Medicare patient - Primary

## 2023-04-04 ASSESSMENT — ENCOUNTER SYMPTOMS
DECREASED CONCENTRATION: 0
PHOTOPHOBIA: 0

## 2023-04-11 ASSESSMENT — PATIENT HEALTH QUESTIONNAIRE - PHQ9
2. FEELING DOWN, DEPRESSED OR HOPELESS: NOT AT ALL
SUM OF ALL RESPONSES TO PHQ9 QUESTIONS 1 AND 2: 0
1. LITTLE INTEREST OR PLEASURE IN DOING THINGS: NOT AT ALL

## 2023-04-11 ASSESSMENT — ACTIVITIES OF DAILY LIVING (ADL)
DOING_HOUSEWORK: INDEPENDENT
MANAGING_FINANCES: INDEPENDENT
BATHING: INDEPENDENT
TAKING_MEDICATION: INDEPENDENT
GROCERY_SHOPPING: INDEPENDENT
DRESSING: INDEPENDENT

## 2023-05-01 DIAGNOSIS — G45.9 TIA (TRANSIENT ISCHEMIC ATTACK): Primary | ICD-10-CM

## 2023-05-02 RX ORDER — CLOPIDOGREL BISULFATE 75 MG/1
TABLET ORAL
Qty: 90 TABLET | Refills: 3 | Status: SHIPPED | OUTPATIENT
Start: 2023-05-02

## 2023-09-13 ENCOUNTER — OFFICE VISIT (OUTPATIENT)
Dept: PRIMARY CARE | Facility: CLINIC | Age: 83
End: 2023-09-13
Payer: MEDICARE

## 2023-09-13 VITALS
WEIGHT: 177.38 LBS | HEART RATE: 95 BPM | DIASTOLIC BLOOD PRESSURE: 64 MMHG | BODY MASS INDEX: 29.52 KG/M2 | SYSTOLIC BLOOD PRESSURE: 116 MMHG

## 2023-09-13 DIAGNOSIS — M25.512 ACUTE PAIN OF LEFT SHOULDER: ICD-10-CM
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
## 2023-09-13 DIAGNOSIS — S49.92XA INJURY OF LEFT SHOULDER, INITIAL ENCOUNTER: ICD-10-CM

## 2023-09-13 DIAGNOSIS — M79.89 LEFT ARM SWELLING: Primary | ICD-10-CM

## 2023-09-13 PROCEDURE — 3074F SYST BP LT 130 MM HG: CPT | Performed by: FAMILY MEDICINE

## 2023-09-13 PROCEDURE — 1159F MED LIST DOCD IN RCRD: CPT | Performed by: FAMILY MEDICINE

## 2023-09-13 PROCEDURE — 1036F TOBACCO NON-USER: CPT | Performed by: FAMILY MEDICINE

## 2023-09-13 PROCEDURE — 99215 OFFICE O/P EST HI 40 MIN: CPT | Performed by: FAMILY MEDICINE

## 2023-09-13 PROCEDURE — 1160F RVW MEDS BY RX/DR IN RCRD: CPT | Performed by: FAMILY MEDICINE

## 2023-09-13 PROCEDURE — 3078F DIAST BP <80 MM HG: CPT | Performed by: FAMILY MEDICINE

## 2023-09-13 ASSESSMENT — ENCOUNTER SYMPTOMS
DIZZINESS: 0
SHORTNESS OF BREATH: 1
WEAKNESS: 0
MYALGIAS: 1
MUSCLE WEAKNESS: 1
COUGH: 1
NUMBNESS: 0
TINGLING: 0
COLOR CHANGE: 0
JOINT SWELLING: 1
HEADACHES: 0
DIFFICULTY URINATING: 0

## 2023-09-13 NOTE — PATIENT INSTRUCTIONS
Assessment/Plan  Edema secondary to fall need to r/o dvt with STAT venous US    Speak to pulmonologist regarding blood in drain and in sputum.  Recommendations: will call once we have xray of left shoulder and STAT US  The patient was also instructed to call IMMEDIATELY (i.e., day or night) if any cardiopulmonary symptoms occur, especially chest pain, shortness of breath, dyspnea on exertion, paroxysmal nocturnal dyspnea, or orthopnea, and these were explained.  Follow up in 1 week and as needed.

## 2023-09-17 NOTE — RESULT ENCOUNTER NOTE
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 Would recommend she see a shoulder specialist.

## 2023-10-06 ENCOUNTER — TELEPHONE (OUTPATIENT)
Dept: PRIMARY CARE | Facility: CLINIC | Age: 83
End: 2023-10-06
Payer: MEDICARE

## 2023-10-06 DIAGNOSIS — I10 PRIMARY HYPERTENSION: Primary | ICD-10-CM

## 2023-10-06 DIAGNOSIS — E78.5 HYPERLIPIDEMIA, UNSPECIFIED HYPERLIPIDEMIA TYPE: ICD-10-CM

## 2023-10-06 RX ORDER — ATORVASTATIN CALCIUM 80 MG/1
80 TABLET, FILM COATED ORAL NIGHTLY
Qty: 90 TABLET | Refills: 3 | Status: SHIPPED | OUTPATIENT
Start: 2023-10-06 | End: 2023-10-06 | Stop reason: SDUPTHER

## 2023-10-06 RX ORDER — METOPROLOL TARTRATE 25 MG/1
25 TABLET, FILM COATED ORAL 2 TIMES DAILY
Qty: 180 TABLET | Refills: 3 | Status: SHIPPED | OUTPATIENT
Start: 2023-10-06

## 2023-10-06 RX ORDER — ATORVASTATIN CALCIUM 80 MG/1
80 TABLET, FILM COATED ORAL NIGHTLY
Qty: 7 TABLET | Refills: 0 | Status: SHIPPED | OUTPATIENT
Start: 2023-10-06 | End: 2024-02-05 | Stop reason: SDUPTHER

## 2023-10-06 NOTE — TELEPHONE ENCOUNTER
Pt requesting a 7-10 day refill of the following medication.  He did not realize he was out of it and has called mailorder for refill.    Atovastatin  80 mg  1qd  #10  Refill: 0      [phone]

## 2023-10-09 DIAGNOSIS — E78.5 HYPERLIPIDEMIA, UNSPECIFIED HYPERLIPIDEMIA TYPE: ICD-10-CM

## 2023-12-01 ENCOUNTER — TELEPHONE (OUTPATIENT)
Dept: PRIMARY CARE | Facility: CLINIC | Age: 83
End: 2023-12-01

## 2023-12-01 ENCOUNTER — OFFICE VISIT (OUTPATIENT)
Dept: PRIMARY CARE | Facility: CLINIC | Age: 83
End: 2023-12-01
Payer: MEDICARE

## 2023-12-01 VITALS
TEMPERATURE: 97.8 F | SYSTOLIC BLOOD PRESSURE: 130 MMHG | HEART RATE: 80 BPM | BODY MASS INDEX: 29.79 KG/M2 | OXYGEN SATURATION: 94 % | DIASTOLIC BLOOD PRESSURE: 81 MMHG | WEIGHT: 179 LBS | RESPIRATION RATE: 16 BRPM

## 2023-12-01 DIAGNOSIS — L03.011 PARONYCHIA OF FINGER OF RIGHT HAND: Primary | ICD-10-CM

## 2023-12-01 PROCEDURE — 1036F TOBACCO NON-USER: CPT | Performed by: NURSE PRACTITIONER

## 2023-12-01 PROCEDURE — 3075F SYST BP GE 130 - 139MM HG: CPT | Performed by: NURSE PRACTITIONER

## 2023-12-01 PROCEDURE — 3079F DIAST BP 80-89 MM HG: CPT | Performed by: NURSE PRACTITIONER

## 2023-12-01 PROCEDURE — 1160F RVW MEDS BY RX/DR IN RCRD: CPT | Performed by: NURSE PRACTITIONER

## 2023-12-01 PROCEDURE — 99213 OFFICE O/P EST LOW 20 MIN: CPT | Performed by: NURSE PRACTITIONER

## 2023-12-01 PROCEDURE — 1159F MED LIST DOCD IN RCRD: CPT | Performed by: NURSE PRACTITIONER

## 2023-12-01 RX ORDER — AMOXICILLIN AND CLAVULANATE POTASSIUM 875; 125 MG/1; MG/1
875 TABLET, FILM COATED ORAL 2 TIMES DAILY
Qty: 14 TABLET | Refills: 0 | Status: SHIPPED | OUTPATIENT
Start: 2023-12-01 | End: 2023-12-08

## 2023-12-01 RX ORDER — MUPIROCIN 20 MG/G
OINTMENT TOPICAL 3 TIMES DAILY
Qty: 22 G | Refills: 0 | Status: SHIPPED | OUTPATIENT
Start: 2023-12-01 | End: 2023-12-11

## 2023-12-01 ASSESSMENT — ENCOUNTER SYMPTOMS
FEVER: 0
DEPRESSION: 0
COUGH: 0
CHILLS: 0
VOMITING: 0
LOSS OF SENSATION IN FEET: 0
NAUSEA: 0
SHORTNESS OF BREATH: 0
ABDOMINAL PAIN: 0
DIARRHEA: 0
OCCASIONAL FEELINGS OF UNSTEADINESS: 0

## 2023-12-01 ASSESSMENT — PATIENT HEALTH QUESTIONNAIRE - PHQ9
1. LITTLE INTEREST OR PLEASURE IN DOING THINGS: NOT AT ALL
2. FEELING DOWN, DEPRESSED OR HOPELESS: NOT AT ALL
10. IF YOU CHECKED OFF ANY PROBLEMS, HOW DIFFICULT HAVE THESE PROBLEMS MADE IT FOR YOU TO DO YOUR WORK, TAKE CARE OF THINGS AT HOME, OR GET ALONG WITH OTHER PEOPLE: NOT DIFFICULT AT ALL
SUM OF ALL RESPONSES TO PHQ9 QUESTIONS 1 AND 2: 0

## 2023-12-01 NOTE — PROGRESS NOTES
Subjective   Chief Complaint: right index finger (Pain, swollen and drainage x 2 days).    HPI   Nick Story is a 83 y.o. male who presents for right index finger (Pain, swollen and drainage x 2 days).    Patient presents with left index finger redness, warmth, and swelling for two days. He reports it started to ooze yellow discharge this morning . He denies known injury. Patient started using OTC topical antibiotic last night.     Patient denies fever, chills, nausea, vomiting, diarrhea, chest pain.       Review of Systems   Constitutional:  Negative for chills and fever.   Respiratory:  Negative for cough and shortness of breath.    Cardiovascular:  Negative for chest pain.   Gastrointestinal:  Negative for abdominal pain, diarrhea, nausea and vomiting.   Skin:         Right index finger redness, warmth, drainage, & bruising        Objective   /81 (BP Location: Left arm, Patient Position: Sitting, BP Cuff Size: Adult)   Pulse 80   Temp 36.6 °C (97.8 °F)   Resp 16   Wt 81.2 kg (179 lb)   SpO2 94%   BMI 29.79 kg/m²   BSA Body surface area is 1.93 meters squared.      Physical Exam  Constitutional:       Appearance: Normal appearance.   Skin:     Comments: Right index finger paronychia with bruising    Neurological:      Mental Status: He is alert.       Lab on 03/30/2023   Component Date Value Ref Range Status    WBC 03/30/2023 4.4  4.4 - 11.3 x10E9/L Final    nRBC 03/30/2023 0.0  0.0 - 0.0 /100 WBC Final    RBC 03/30/2023 3.25 (L)  4.50 - 5.90 x10E12/L Final    Hemoglobin 03/30/2023 11.5 (L)  13.5 - 17.5 g/dL Final    Hematocrit 03/30/2023 36.8 (L)  41.0 - 52.0 % Final    MCV 03/30/2023 113 (H)  80 - 100 fL Final    MCHC 03/30/2023 31.3 (L)  32.0 - 36.0 g/dL Final    Platelets 03/30/2023 126 (L)  150 - 450 x10E9/L Final    RDW 03/30/2023 16.3 (H)  11.5 - 14.5 % Final    Neutrophils % 03/30/2023 74.6  40.0 - 80.0 % Final    Immature Granulocytes %, Automated 03/30/2023 0.7  0.0 - 0.9 % Final      Immature Granulocyte Count (IG) includes promyelocytes,    myelocytes and metamyelocytes but does not include bands.   Percent differential counts (%) should be interpreted in the   context of the absolute cell counts (cells/L).    Lymphocytes % 03/30/2023 12.8  13.0 - 44.0 % Final    Monocytes % 03/30/2023 9.8  2.0 - 10.0 % Final    Eosinophils % 03/30/2023 1.6  0.0 - 6.0 % Final    Basophils % 03/30/2023 0.5  0.0 - 2.0 % Final    Neutrophils Absolute 03/30/2023 3.28  1.60 - 5.50 x10E9/L Final    Lymphocytes Absolute 03/30/2023 0.56 (L)  0.80 - 3.00 x10E9/L Final    Monocytes Absolute 03/30/2023 0.43  0.05 - 0.80 x10E9/L Final    Eosinophils Absolute 03/30/2023 0.07  0.00 - 0.40 x10E9/L Final    Basophils Absolute 03/30/2023 0.02  0.00 - 0.10 x10E9/L Final   Orders Only on 03/14/2023   Component Date Value Ref Range Status    Prostate Specific Antigen,Screen 03/14/2023 1.27  0.00 - 4.00 ng/mL Final    Comment: The FDA requires that the method used for PSA assay be   reported to the physician. Values obtained with different   assay methods must not be used interchangeably. This test   was performed at [facility] (formerly [facility]) using the Siemens  HipChatllFresh ! PSA method, which is a sandwich immunoassay using   chemiluminescence for quantitation. The assay is approved  for measurement of prostate-specific antigen (PSA) in   serum and may be used in conjunction with a digital rectal  examination in men 50 years and older as an aid in   detection of prostate cancer.   5-Alpha-reductase inhibitors (e.g. Proscar, Finasteride,   Avodart, Dutasteride and Altagracia) for the treatment of BPH   have been shown to lower PSA levels by an average of 50%   after 6 months of treatment.     Orders Only on 03/14/2023   Component Date Value Ref Range Status    Cholesterol 03/14/2023 CANCELED   Final-Edited    Comment: .      AGE      DESIRABLE   BORDERLINE HIGH   HIGH     0-19 Y     0 - 169       170 - 199     >/= 200    20-24 Y     0 - 189        190 - 224     >/= 225         >24 Y     0 - 199       200 - 239     >/= 240   **All ranges are based on fasting samples. Specific   therapeutic targets will vary based on patient-specific   cardiac risk.  .   Pediatric guidelines reference:Pediatrics 2011, 128(S5).   Adult guidelines reference: NCEP ATPIII Guidelines,     LAUREANO 2001, 258:2486-97  .   Venipuncture immediately after or during the    administration of Metamizole may lead to falsely   low results. Testing should be performed immediately   prior to Metamizole dosing.    Result canceled by the ancillary.      HDL 03/14/2023 CANCELED   Final-Edited    Comment: .      AGE      VERY LOW   LOW     NORMAL    HIGH       0-19 Y       < 35   < 40     40-45     ----    20-24 Y       ----   < 40       >45     ----      >24 Y       ----   < 40     40-60      >60  .    Result canceled by the ancillary.      Cholesterol/HDL Ratio 03/14/2023 CANCELED   Final-Edited    Result canceled by the ancillary.    LDL 03/14/2023 CANCELED   Final-Edited    Comment: .                           NEAR      BORD      AGE      DESIRABLE  OPTIMAL    HIGH     HIGH     VERY HIGH     0-19 Y     0 - 109     ---    110-129   >/= 130     ----    20-24 Y     0 - 119     ---    120-159   >/= 160     ----      >24 Y     0 -  99   100-129  130-159   160-189     >/=190  .    Result canceled by the ancillary.      VLDL 03/14/2023 CANCELED   Final-Edited    Result canceled by the ancillary.    Triglycerides 03/14/2023 CANCELED   Final-Edited    Comment: .      AGE      DESIRABLE   BORDERLINE HIGH   HIGH     VERY HIGH   0 D-90 D    19 - 174         ----         ----        ----  91 D- 9 Y     0 -  74        75 -  99     >/= 100      ----    10-19 Y     0 -  89        90 - 129     >/= 130      ----    20-24 Y     0 - 114       115 - 149     >/= 150      ----         >24 Y     0 - 149       150 - 199    200- 499    >/= 500  .   Venipuncture immediately after or during the    administration of  Metamizole may lead to falsely   low results. Testing should be performed immediately   prior to Metamizole dosing.    Result canceled by the ancillary.      Non HDL Cholesterol 03/14/2023 CANCELED   Final-Edited    Comment:     AGE      DESIRABLE   BORDERLINE HIGH   HIGH     VERY HIGH     0-19 Y     0 - 119       120 - 144     >/= 145    >/= 160    20-24 Y     0 - 149       150 - 189     >/= 190      ----         >24 Y    30 MG/DL ABOVE LDL CHOLESTEROL GOAL  .    Result canceled by the ancillary.     Orders Only on 03/14/2023   Component Date Value Ref Range Status    TSH 03/14/2023 CANCELED   Final-Edited    Comment:  TSH testing is performed using different testing    methodology at [facility] than at other    [facility]. Direct result comparisons should    only be made within the same method.    Result canceled by the ancillary.     Orders Only on 03/14/2023   Component Date Value Ref Range Status    Glucose 03/14/2023 CANCELED   Final-Edited    Result canceled by the ancillary.    Sodium 03/14/2023 CANCELED   Final-Edited    Result canceled by the ancillary.    Potassium 03/14/2023 CANCELED   Final-Edited    Result canceled by the ancillary.    Chloride 03/14/2023 CANCELED   Final-Edited    Result canceled by the ancillary.    Bicarbonate 03/14/2023 CANCELED   Final-Edited    Result canceled by the ancillary.    Anion Gap 03/14/2023 CANCELED   Final-Edited    Result canceled by the ancillary.    Urea Nitrogen 03/14/2023 CANCELED   Final-Edited    Result canceled by the ancillary.    Creatinine 03/14/2023 CANCELED   Final-Edited    Result canceled by the ancillary.    GFR Female 03/14/2023 CANCELED   Final-Edited    Comment:  CALCULATIONS OF ESTIMATED GFR ARE PERFORMED   USING THE 2021 CKD-EPI STUDY REFIT EQUATION   WITHOUT THE RACE VARIABLE FOR THE IDMS-TRACEABLE   CREATININE METHODS.    https://jasn.asnjournals.org/content/early/2021/09/22/ASN.0352185741    Result canceled by the  ancillary.      GFR MALE 03/14/2023 CANCELED   Final-Edited    Comment:  CALCULATIONS OF ESTIMATED GFR ARE PERFORMED   USING THE 2021 CKD-EPI STUDY REFIT EQUATION   WITHOUT THE RACE VARIABLE FOR THE IDMS-TRACEABLE   CREATININE METHODS.    https://jasn.asnjournals.org/content/early/2021/09/22/ASN.[email or web address]    Result canceled by the ancillary.      Calcium 03/14/2023 CANCELED   Final-Edited    Result canceled by the ancillary.    Albumin 03/14/2023 CANCELED   Final-Edited    Result canceled by the ancillary.    Alkaline Phosphatase 03/14/2023 CANCELED   Final-Edited    Result canceled by the ancillary.    Total Protein 03/14/2023 CANCELED   Final-Edited    Result canceled by the ancillary.    AST 03/14/2023 CANCELED   Final-Edited    Result canceled by the ancillary.    Total Bilirubin 03/14/2023 CANCELED   Final-Edited    Result canceled by the ancillary.    ALT (SGPT) 03/14/2023 CANCELED   Final-Edited    Comment:  Patients treated with Sulfasalazine may generate    falsely decreased results for ALT.    Result canceled by the ancillary.     Orders Only on 03/14/2023   Component Date Value Ref Range Status    Hemoglobin A1C 03/14/2023 CANCELED   Final-Edited    Comment:      Diagnosis of Diabetes-Adults   Non-Diabetic: < or = 5.6%   Increased risk for developing diabetes: 5.7-6.4%   Diagnostic of diabetes: > or = 6.5%  .       Monitoring of Diabetes                Age (y)     Therapeutic Goal (%)   Adults:          >18           <7.0   Pediatrics:    13-18           <7.5                   7-12           <8.0                   0- 6            7.5-8.5   American Diabetes Association. Diabetes Care 33(S1), Jan 2010.    Result canceled by the ancillary.      Estimated Average Glucose 03/14/2023 CANCELED   Final-Edited    Result canceled by the ancillary.    HGB A1C 03/14/2023 CANCELED  % Final-Edited    Comment:       Diagnosis of Diabetes-Adults   Non-Diabetic: < or = 5.6%   Increased risk for developing diabetes:  5.7-6.4%   Diagnostic of diabetes: > or = 6.5%  .        Monitoring of Diabetes                Age (y)     Therapeutic Goal (%)   Adults:          >18           <7.0   Pediatrics:    13-18           <7.5                   7-12           <8.0                   0- 6            7.5-8.5   American Diabetes Association. Diabetes Care 33(S1), Jan 2010.    Result canceled by the ancillary.     Orders Only on 03/14/2023   Component Date Value Ref Range Status    WBC 03/14/2023 CANCELED   Final-Edited    Result canceled by the ancillary.    nRBC 03/14/2023 CANCELED   Final-Edited    Result canceled by the ancillary.    RBC 03/14/2023 CANCELED   Final-Edited    Result canceled by the ancillary.    Hemoglobin 03/14/2023 CANCELED   Final-Edited    Result canceled by the ancillary.    Hematocrit 03/14/2023 CANCELED   Final-Edited    Result canceled by the ancillary.    MCV 03/14/2023 CANCELED   Final-Edited    Result canceled by the ancillary.    MCHC 03/14/2023 CANCELED   Final-Edited    Result canceled by the ancillary.    Platelets 03/14/2023 CANCELED   Final-Edited    Result canceled by the ancillary.    RDW 03/14/2023 CANCELED   Final-Edited    Result canceled by the ancillary.    Neutrophils % 03/14/2023 CANCELED   Final-Edited    Result canceled by the ancillary.    Immature Granulocytes %, Automated 03/14/2023 CANCELED   Final-Edited    Comment:  Immature Granulocyte Count (IG) includes promyelocytes,    myelocytes and metamyelocytes but does not include bands.   Percent differential counts (%) should be interpreted in the   context of the absolute cell counts (cells/L).    Result canceled by the ancillary.      Lymphocytes % 03/14/2023 CANCELED   Final-Edited    Result canceled by the ancillary.    Monocytes % 03/14/2023 CANCELED   Final-Edited    Result canceled by the ancillary.    Eosinophils % 03/14/2023 CANCELED   Final-Edited    Result canceled by the ancillary.    Basophils % 03/14/2023 CANCELED   Final-Edited     Result canceled by the ancillary.    Neutrophils Absolute 03/14/2023 CANCELED   Final-Edited    Result canceled by the ancillary.    Lymphocytes Absolute 03/14/2023 CANCELED   Final-Edited    Result canceled by the ancillary.    Monocytes Absolute 03/14/2023 CANCELED   Final-Edited    Result canceled by the ancillary.    Eosinophils Absolute 03/14/2023 CANCELED   Final-Edited    Result canceled by the ancillary.    Basophils Absolute 03/14/2023 CANCELED   Final-Edited    Result canceled by the ancillary.    MANUAL DIFFERENTIAL Y/N 03/14/2023 CANCELED   Final-Edited    Result canceled by the ancillary.   Lab on 03/14/2023   Component Date Value Ref Range Status    Iron 03/14/2023 109  35 - 150 ug/dL Final    TIBC 03/14/2023 248  240 - 445 ug/dL Final    Iron Saturation 03/14/2023 44  25 - 45 % Final    Ferritin 03/14/2023 326 (H)  20 - 300 ug/L Final    Retic % 03/14/2023 2.1 (H)  0.5 - 2.0 % Final    Retic Absolute 03/14/2023 0.075  0.017 - 0.110 x10E12/L Final    Immature Retic fraction 03/14/2023 18.5 (H)  0.0 - 16.0 % Final    Reticulocyte Hemoglobin 03/14/2023 38  28 - 38 pg Final    Vitamin B-12 03/14/2023 551  211 - 911 pg/mL Final   Lab on 03/10/2023   Component Date Value Ref Range Status    WBC 03/10/2023 6.0  4.4 - 11.3 x10E9/L Final    nRBC 03/10/2023 0.0  0.0 - 0.0 /100 WBC Final    RBC 03/10/2023 3.19 (L)  4.50 - 5.90 x10E12/L Final    Hemoglobin 03/10/2023 11.2 (L)  13.5 - 17.5 g/dL Final    Hematocrit 03/10/2023 36.3 (L)  41.0 - 52.0 % Final    MCV 03/10/2023 114 (H)  80 - 100 fL Final    MCHC 03/10/2023 30.9 (L)  32.0 - 36.0 g/dL Final    Platelets 03/10/2023 149 (L)  150 - 450 x10E9/L Final    RDW 03/10/2023 16.6 (H)  11.5 - 14.5 % Final    Neutrophils % 03/10/2023 86.6  40.0 - 80.0 % Final    Immature Granulocytes %, Automated 03/10/2023 0.3  0.0 - 0.9 % Final     Immature Granulocyte Count (IG) includes promyelocytes,    myelocytes and metamyelocytes but does not include bands.   Percent  differential counts (%) should be interpreted in the   context of the absolute cell counts (cells/L).    Lymphocytes % 03/10/2023 5.2  13.0 - 44.0 % Final    Monocytes % 03/10/2023 7.2  2.0 - 10.0 % Final    Eosinophils % 03/10/2023 0.5  0.0 - 6.0 % Final    Basophils % 03/10/2023 0.2  0.0 - 2.0 % Final    Neutrophils Absolute 03/10/2023 5.18  1.60 - 5.50 x10E9/L Final    Lymphocytes Absolute 03/10/2023 0.31 (L)  0.80 - 3.00 x10E9/L Final    Monocytes Absolute 03/10/2023 0.43  0.05 - 0.80 x10E9/L Final    Eosinophils Absolute 03/10/2023 0.03  0.00 - 0.40 x10E9/L Final    Basophils Absolute 03/10/2023 0.01  0.00 - 0.10 x10E9/L Final    Hemoglobin A1C 03/10/2023 5.5  % Final         Diagnosis of Diabetes-Adults   Non-Diabetic: < or = 5.6%   Increased risk for developing diabetes: 5.7-6.4%   Diagnostic of diabetes: > or = 6.5%  .       Monitoring of Diabetes                Age (y)     Therapeutic Goal (%)   Adults:          >18           <7.0   Pediatrics:    13-18           <7.5                   7-12           <8.0                   0- 6            7.5-8.5   American Diabetes Association. Diabetes Care 33(S1), Jan 2010.    Estimated Average Glucose 03/10/2023 111  MG/DL Final    TSH 03/10/2023 3.00  0.44 - 3.98 mIU/L Final     TSH testing is performed using different testing    methodology at [facility] than at other    [facility]. Direct result comparisons should    only be made within the same method.    Glucose 03/10/2023 115 (H)  74 - 99 mg/dL Final    Sodium 03/10/2023 136  136 - 145 mmol/L Final    Potassium 03/10/2023 5.2  3.5 - 5.3 mmol/L Final    Chloride 03/10/2023 102  98 - 107 mmol/L Final    Bicarbonate 03/10/2023 28  21 - 32 mmol/L Final    Anion Gap 03/10/2023 11  10 - 20 mmol/L Final    Urea Nitrogen 03/10/2023 30 (H)  6 - 23 mg/dL Final    Creatinine 03/10/2023 1.79 (H)  0.50 - 1.30 mg/dL Final    GFR MALE 03/10/2023 37 (A)  >90 mL/min/1.73m2 Final     CALCULATIONS OF ESTIMATED  GFR ARE PERFORMED   USING THE 2021 CKD-EPI STUDY REFIT EQUATION   WITHOUT THE RACE VARIABLE FOR THE IDMS-TRACEABLE   CREATININE METHODS.    https://jasn.asnjournals.org/content/early/2021/09/22/ASN.8673578512    Calcium 03/10/2023 8.9  8.6 - 10.6 mg/dL Final    Albumin 03/10/2023 3.7  3.4 - 5.0 g/dL Final    Alkaline Phosphatase 03/10/2023 95  33 - 136 U/L Final    Total Protein 03/10/2023 7.2  6.4 - 8.2 g/dL Final    AST 03/10/2023 28  9 - 39 U/L Final    Total Bilirubin 03/10/2023 1.5 (H)  0.0 - 1.2 mg/dL Final    ALT (SGPT) 03/10/2023 14  10 - 52 U/L Final     Patients treated with Sulfasalazine may generate    falsely decreased results for ALT.    Cholesterol 03/10/2023 146  0 - 199 mg/dL Final    .      AGE      DESIRABLE   BORDERLINE HIGH   HIGH     0-19 Y     0 - 169       170 - 199     >/= 200    20-24 Y     0 - 189       190 - 224     >/= 225         >24 Y     0 - 199       200 - 239     >/= 240   **All ranges are based on fasting samples. Specific   therapeutic targets will vary based on patient-specific   cardiac risk.  .   Pediatric guidelines reference:Pediatrics 2011, 128(S5).   Adult guidelines reference: NCEP ATPIII Guidelines,     LAUREANO 2001, 258:2486-97  .   Venipuncture immediately after or during the    administration of Metamizole may lead to falsely   low results. Testing should be performed immediately   prior to Metamizole dosing.    HDL 03/10/2023 41.6  mg/dL Final    .      AGE      VERY LOW   LOW     NORMAL    HIGH       0-19 Y       < 35   < 40     40-45     ----    20-24 Y       ----   < 40       >45     ----      >24 Y       ----   < 40     40-60      >60  .    Cholesterol/HDL Ratio 03/10/2023 3.5   Final    REF VALUES  DESIRABLE  < 3.4  HIGH RISK  > 5.0    LDL 03/10/2023 92  0 - 99 mg/dL Final    .                           NEAR      BORD      AGE      DESIRABLE  OPTIMAL    HIGH     HIGH     VERY HIGH     0-19 Y     0 - 109     ---    110-129   >/= 130     ----    20-24 Y     0 - 119      ---    120-159   >/= 160     ----      >24 Y     0 -  99   100-129  130-159   160-189     >/=190  .    VLDL 03/10/2023 12  0 - 40 mg/dL Final    Triglycerides 03/10/2023 62  0 - 149 mg/dL Final    .      AGE      DESIRABLE   BORDERLINE HIGH   HIGH     VERY HIGH   0 D-90 D    19 - 174         ----         ----        ----  91 D- 9 Y     0 -  74        75 -  99     >/= 100      ----    10-19 Y     0 -  89        90 - 129     >/= 130      ----    20-24 Y     0 - 114       115 - 149     >/= 150      ----         >24 Y     0 - 149       150 - 199    200- 499    >/= 500  .   Venipuncture immediately after or during the    administration of Metamizole may lead to falsely   low results. Testing should be performed immediately   prior to Metamizole dosing.     Current Outpatient Medications on File Prior to Visit   Medication Sig Dispense Refill    albuterol 90 mcg/actuation inhaler Inhale 1-2 puffs. INHALE 1 TO 2 PUFFS EVERY 4 TO 6 HOURS AS NEEDED.      aspirin 81 mg EC tablet Take 1 tablet (81 mg) by mouth once daily.      atorvastatin (Lipitor) 80 mg tablet Take 1 tablet (80 mg) by mouth once daily at bedtime. 7 tablet 0    cholecalciferol (Vitamin D-3) 50 mcg (2,000 unit) capsule Take 2 capsules (100 mcg) by mouth. TAKE 2 CAPSULE Once      cilostazol (Pletal) 100 mg tablet Take 1 tablet (100 mg) by mouth 2 times a day.      clopidogrel (Plavix) 75 mg tablet TAKE 1 TABLET BY MOUTH ONCE  DAILY 90 tablet 3    furosemide (Lasix) 40 mg tablet Take 1 tablet (40 mg) by mouth once daily in the morning. Take before meals.      losartan (Cozaar) 50 mg tablet Take 0.5 tablets (25 mg) by mouth once daily.      magnesium oxide (Mag-Ox) 400 mg (241.3 mg magnesium) tablet Take 1 tablet (400 mg) by mouth.      metoprolol tartrate (Lopressor) 25 mg tablet TAKE 1 TABLET BY MOUTH  TWICE DAILY 180 tablet 3    multivitamin tablet Take 1 tablet by mouth once daily.      nitroglycerin (Nitrostat) 0.4 mg SL tablet Place 1 tablet (0.4 mg) under  the tongue. DISSOLVE 1 TABLET UNDER THE TONGUE AS NEEDED FOR ANGINA      OneTouch Ultra Test strip 1 strip  in the morning and 1 strip before bedtime. TEST TWO TIMES A DAY.      pioglitazone (Actos) 30 mg tablet Take 1 tablet (30 mg) by mouth once daily.      tiZANidine (Zanaflex) 2 mg tablet Take 1-2 tablets (2-4 mg) by mouth every 8 hours if needed for muscle spasms.       No current facility-administered medications on file prior to visit.     No images are attached to the encounter.            Assessment/Plan   Problem List Items Addressed This Visit             ICD-10-CM    Paronychia of finger of right hand - Primary L03.011     Start taking antibiotic as prescribed  Start using topical antibiotic as prescribed   Follow up in 1 week if symptoms are not improving  Report to ER if you develop fever, chills, or if infection starts to spread up your hand          Relevant Medications    mupirocin (Bactroban) 2 % ointment    amoxicillin-pot clavulanate (Augmentin) 875-125 mg tablet

## 2023-12-01 NOTE — TELEPHONE ENCOUNTER
Patient called in stating that the Bactroban ointment is not covered by his insurance. Wants to know if something else can be sent into the pharmacy.     Grupo Aguilar CMA  12/1/2023  Practice Supervisor  [facility]

## 2023-12-01 NOTE — PATIENT INSTRUCTIONS
Start taking antibiotic as prescribed  Start using topical antibiotic as prescribed   Follow up in 1 week if symptoms are not improving  Report to ER if you develop fever, chills, or if infection starts to spread up your hand

## 2024-01-14 DIAGNOSIS — E11.40 CONTROLLED TYPE 2 DIABETES MELLITUS WITH DIABETIC NEUROPATHY, WITHOUT LONG-TERM CURRENT USE OF INSULIN (MULTI): Primary | ICD-10-CM

## 2024-01-15 RX ORDER — PIOGLITAZONEHYDROCHLORIDE 30 MG/1
30 TABLET ORAL DAILY
Qty: 90 TABLET | Refills: 3 | Status: SHIPPED | OUTPATIENT
Start: 2024-01-15

## 2024-02-05 DIAGNOSIS — E78.5 HYPERLIPIDEMIA, UNSPECIFIED HYPERLIPIDEMIA TYPE: ICD-10-CM

## 2024-02-05 RX ORDER — ATORVASTATIN CALCIUM 80 MG/1
80 TABLET, FILM COATED ORAL NIGHTLY
Qty: 90 TABLET | Refills: 1 | Status: SHIPPED | OUTPATIENT
Start: 2024-02-05

## 2024-02-21 ENCOUNTER — TELEPHONE (OUTPATIENT)
Dept: PRIMARY CARE | Facility: CLINIC | Age: 84
End: 2024-02-21
Payer: MEDICARE

## 2024-02-21 NOTE — TELEPHONE ENCOUNTER
Patient due for an MCRA at any point. Schedule with Raj or Dr. Wright. Please schedule patient and send this encounter to [facility] for lab orders.       Thank you-  Grupo Aguilar CMA  2/21/2024  Practice Supervisor  [facility]

## 2024-03-04 DIAGNOSIS — I10 HYPERTENSION, UNSPECIFIED TYPE: Primary | ICD-10-CM

## 2024-03-04 RX ORDER — LOSARTAN POTASSIUM 50 MG/1
TABLET ORAL
Qty: 90 TABLET | Refills: 1 | Status: SHIPPED | OUTPATIENT
Start: 2024-03-04

## 2024-06-07 DIAGNOSIS — Z12.5 SCREENING PSA (PROSTATE SPECIFIC ANTIGEN): ICD-10-CM

## 2024-06-07 DIAGNOSIS — I10 HYPERTENSION, UNSPECIFIED TYPE: ICD-10-CM

## 2024-06-07 DIAGNOSIS — E78.5 HYPERLIPIDEMIA, UNSPECIFIED HYPERLIPIDEMIA TYPE: ICD-10-CM

## 2024-06-17 DIAGNOSIS — E78.5 HYPERLIPIDEMIA, UNSPECIFIED HYPERLIPIDEMIA TYPE: ICD-10-CM

## 2024-06-18 RX ORDER — ATORVASTATIN CALCIUM 80 MG/1
80 TABLET, FILM COATED ORAL NIGHTLY
Qty: 90 TABLET | Refills: 3 | Status: SHIPPED | OUTPATIENT
Start: 2024-06-18

## 2024-07-07 DIAGNOSIS — I10 PRIMARY HYPERTENSION: ICD-10-CM

## 2024-07-08 RX ORDER — METOPROLOL TARTRATE 25 MG/1
25 TABLET, FILM COATED ORAL 2 TIMES DAILY
Qty: 180 TABLET | Refills: 3 | Status: SHIPPED | OUTPATIENT
Start: 2024-07-08

## 2024-07-16 ENCOUNTER — LAB (OUTPATIENT)
Dept: LAB | Facility: LAB | Age: 84
End: 2024-07-16
Payer: MEDICARE

## 2024-07-16 DIAGNOSIS — Z12.5 SCREENING PSA (PROSTATE SPECIFIC ANTIGEN): ICD-10-CM

## 2024-07-16 DIAGNOSIS — D64.9 ANEMIA, UNSPECIFIED TYPE: ICD-10-CM

## 2024-07-16 DIAGNOSIS — I10 HYPERTENSION, UNSPECIFIED TYPE: ICD-10-CM

## 2024-07-16 DIAGNOSIS — E78.5 HYPERLIPIDEMIA, UNSPECIFIED HYPERLIPIDEMIA TYPE: ICD-10-CM

## 2024-07-16 PROCEDURE — 80061 LIPID PANEL: CPT

## 2024-07-16 PROCEDURE — 85007 BL SMEAR W/DIFF WBC COUNT: CPT

## 2024-07-16 PROCEDURE — 83540 ASSAY OF IRON: CPT

## 2024-07-16 PROCEDURE — 36415 COLL VENOUS BLD VENIPUNCTURE: CPT

## 2024-07-16 PROCEDURE — 82607 VITAMIN B-12: CPT

## 2024-07-16 PROCEDURE — 82728 ASSAY OF FERRITIN: CPT

## 2024-07-16 PROCEDURE — 84443 ASSAY THYROID STIM HORMONE: CPT

## 2024-07-16 PROCEDURE — 85027 COMPLETE CBC AUTOMATED: CPT

## 2024-07-16 PROCEDURE — G0103 PSA SCREENING: HCPCS

## 2024-07-16 PROCEDURE — 83550 IRON BINDING TEST: CPT

## 2024-07-16 PROCEDURE — 80053 COMPREHEN METABOLIC PANEL: CPT

## 2024-07-17 LAB
ALBUMIN SERPL BCP-MCNC: 3.9 G/DL (ref 3.4–5)
ALP SERPL-CCNC: 91 U/L (ref 33–136)
ALT SERPL W P-5'-P-CCNC: 13 U/L (ref 10–52)
ANION GAP SERPL CALC-SCNC: 10 MMOL/L (ref 10–20)
AST SERPL W P-5'-P-CCNC: 21 U/L (ref 9–39)
BASOPHILS # BLD MANUAL: 0 X10*3/UL (ref 0–0.1)
BASOPHILS NFR BLD MANUAL: 0 %
BILIRUB SERPL-MCNC: 1.1 MG/DL (ref 0–1.2)
BUN SERPL-MCNC: 30 MG/DL (ref 6–23)
CALCIUM SERPL-MCNC: 8.7 MG/DL (ref 8.6–10.6)
CHLORIDE SERPL-SCNC: 104 MMOL/L (ref 98–107)
CHOLEST SERPL-MCNC: 174 MG/DL (ref 0–199)
CHOLESTEROL/HDL RATIO: 3.5
CO2 SERPL-SCNC: 27 MMOL/L (ref 21–32)
CREAT SERPL-MCNC: 1.42 MG/DL (ref 0.5–1.3)
EGFRCR SERPLBLD CKD-EPI 2021: 48 ML/MIN/1.73M*2
EOSINOPHIL # BLD MANUAL: 0.1 X10*3/UL (ref 0–0.4)
EOSINOPHIL NFR BLD MANUAL: 2.7 %
ERYTHROCYTE [DISTWIDTH] IN BLOOD BY AUTOMATED COUNT: 17.7 % (ref 11.5–14.5)
GLUCOSE SERPL-MCNC: 92 MG/DL (ref 74–99)
HCT VFR BLD AUTO: 33.8 % (ref 41–52)
HDLC SERPL-MCNC: 50.4 MG/DL
HGB BLD-MCNC: 10.2 G/DL (ref 13.5–17.5)
IMM GRANULOCYTES # BLD AUTO: 0.01 X10*3/UL (ref 0–0.5)
IMM GRANULOCYTES NFR BLD AUTO: 0.3 % (ref 0–0.9)
LDLC SERPL CALC-MCNC: 105 MG/DL
LYMPHOCYTES # BLD MANUAL: 0.44 X10*3/UL (ref 0.8–3)
LYMPHOCYTES NFR BLD MANUAL: 11.6 %
MCH RBC QN AUTO: 35.2 PG (ref 26–34)
MCHC RBC AUTO-ENTMCNC: 30.2 G/DL (ref 32–36)
MCV RBC AUTO: 117 FL (ref 80–100)
MONOCYTES # BLD MANUAL: 0.14 X10*3/UL (ref 0.05–0.8)
MONOCYTES NFR BLD MANUAL: 3.6 %
NEUTS SEG # BLD MANUAL: 3.12 X10*3/UL (ref 1.6–5)
NEUTS SEG NFR BLD MANUAL: 82.1 %
NON HDL CHOLESTEROL: 124 MG/DL (ref 0–149)
NRBC BLD-RTO: 0 /100 WBCS (ref 0–0)
OVALOCYTES BLD QL SMEAR: ABNORMAL
PATH REVIEW-CBC DIFFERENTIAL: NORMAL
PLATELET # BLD AUTO: 157 X10*3/UL (ref 150–450)
POTASSIUM SERPL-SCNC: 4.2 MMOL/L (ref 3.5–5.3)
PROT SERPL-MCNC: 7.4 G/DL (ref 6.4–8.2)
PSA SERPL-MCNC: 0.64 NG/ML
RBC # BLD AUTO: 2.9 X10*6/UL (ref 4.5–5.9)
RBC MORPH BLD: ABNORMAL
SODIUM SERPL-SCNC: 137 MMOL/L (ref 136–145)
TOTAL CELLS COUNTED BLD: 112
TRIGL SERPL-MCNC: 92 MG/DL (ref 0–149)
TSH SERPL-ACNC: 2.3 MIU/L (ref 0.44–3.98)
VLDL: 18 MG/DL (ref 0–40)
WBC # BLD AUTO: 3.8 X10*3/UL (ref 4.4–11.3)

## 2024-07-18 DIAGNOSIS — D64.9 ANEMIA, UNSPECIFIED TYPE: ICD-10-CM

## 2024-07-18 DIAGNOSIS — D51.9 ANEMIA DUE TO VITAMIN B12 DEFICIENCY, UNSPECIFIED B12 DEFICIENCY TYPE: ICD-10-CM

## 2024-07-18 LAB
FERRITIN SERPL-MCNC: 217 NG/ML (ref 20–300)
IRON SATN MFR SERPL: 36 % (ref 25–45)
IRON SERPL-MCNC: 112 UG/DL (ref 35–150)
TIBC SERPL-MCNC: 308 UG/DL (ref 240–445)
UIBC SERPL-MCNC: 196 UG/DL (ref 110–370)
VIT B12 SERPL-MCNC: 378 PG/ML (ref 211–911)

## 2024-07-19 ENCOUNTER — LAB (OUTPATIENT)
Dept: LAB | Facility: LAB | Age: 84
End: 2024-07-19
Payer: MEDICARE

## 2024-07-19 ENCOUNTER — APPOINTMENT (OUTPATIENT)
Dept: PRIMARY CARE | Facility: CLINIC | Age: 84
End: 2024-07-19
Payer: MEDICARE

## 2024-07-19 VITALS
SYSTOLIC BLOOD PRESSURE: 118 MMHG | OXYGEN SATURATION: 95 % | DIASTOLIC BLOOD PRESSURE: 69 MMHG | HEART RATE: 63 BPM | HEIGHT: 65 IN | WEIGHT: 180.38 LBS | BODY MASS INDEX: 30.05 KG/M2

## 2024-07-19 DIAGNOSIS — I25.10 CORONARY ARTERY DISEASE INVOLVING NATIVE HEART WITHOUT ANGINA PECTORIS, UNSPECIFIED VESSEL OR LESION TYPE: ICD-10-CM

## 2024-07-19 DIAGNOSIS — I50.32 CHRONIC HEART FAILURE WITH PRESERVED EJECTION FRACTION (MULTI): ICD-10-CM

## 2024-07-19 DIAGNOSIS — E78.5 HYPERLIPIDEMIA, UNSPECIFIED HYPERLIPIDEMIA TYPE: ICD-10-CM

## 2024-07-19 DIAGNOSIS — D51.9 ANEMIA DUE TO VITAMIN B12 DEFICIENCY, UNSPECIFIED B12 DEFICIENCY TYPE: ICD-10-CM

## 2024-07-19 DIAGNOSIS — I10 HYPERTENSION, UNSPECIFIED TYPE: ICD-10-CM

## 2024-07-19 DIAGNOSIS — Z93.8: ICD-10-CM

## 2024-07-19 DIAGNOSIS — N18.30 CKD STAGE 3 SECONDARY TO DIABETES (MULTI): ICD-10-CM

## 2024-07-19 DIAGNOSIS — D64.9 ANEMIA, UNSPECIFIED TYPE: ICD-10-CM

## 2024-07-19 DIAGNOSIS — I34.0 MITRAL VALVE INSUFFICIENCY, UNSPECIFIED ETIOLOGY: ICD-10-CM

## 2024-07-19 DIAGNOSIS — E11.22 CKD STAGE 3 SECONDARY TO DIABETES (MULTI): ICD-10-CM

## 2024-07-19 DIAGNOSIS — Z00.00 ENCOUNTER FOR ANNUAL WELLNESS VISIT (AWV) IN MEDICARE PATIENT: Primary | ICD-10-CM

## 2024-07-19 DIAGNOSIS — Z71.89 CARDIAC RISK COUNSELING: ICD-10-CM

## 2024-07-19 DIAGNOSIS — Z71.89 ADVANCE DIRECTIVE DISCUSSED WITH PATIENT: ICD-10-CM

## 2024-07-19 PROBLEM — S93.402A SPRAIN OF LEFT ANKLE: Status: RESOLVED | Noted: 2023-03-04 | Resolved: 2024-07-19

## 2024-07-19 PROBLEM — R60.9 PAROTID SWELLING: Status: RESOLVED | Noted: 2023-03-04 | Resolved: 2024-07-19

## 2024-07-19 PROBLEM — R05.9 COUGH: Status: RESOLVED | Noted: 2023-03-04 | Resolved: 2024-07-19

## 2024-07-19 PROBLEM — G62.9 PERIPHERAL NEUROPATHY: Status: RESOLVED | Noted: 2023-03-04 | Resolved: 2024-07-19

## 2024-07-19 PROBLEM — M79.672 FOOT ARCH PAIN, LEFT: Status: RESOLVED | Noted: 2023-03-04 | Resolved: 2024-07-19

## 2024-07-19 PROBLEM — R06.00 DYSPNEA: Status: RESOLVED | Noted: 2023-03-04 | Resolved: 2024-07-19

## 2024-07-19 PROBLEM — I73.9 PERIPHERAL VASCULAR DISEASE (CMS-HCC): Status: RESOLVED | Noted: 2023-03-04 | Resolved: 2024-07-19

## 2024-07-19 PROBLEM — R74.8 ELEVATED VITAMIN B12 LEVEL: Status: RESOLVED | Noted: 2023-03-04 | Resolved: 2024-07-19

## 2024-07-19 PROBLEM — E16.2 HYPOGLYCEMIA: Status: RESOLVED | Noted: 2023-03-04 | Resolved: 2024-07-19

## 2024-07-19 PROBLEM — G45.9 TIA (TRANSIENT ISCHEMIC ATTACK): Status: RESOLVED | Noted: 2023-03-04 | Resolved: 2024-07-19

## 2024-07-19 PROBLEM — M25.579 JOINT PAIN, FOOT: Status: RESOLVED | Noted: 2023-03-04 | Resolved: 2024-07-19

## 2024-07-19 PROBLEM — R79.89 ELEVATED VITAMIN B12 LEVEL: Status: RESOLVED | Noted: 2023-03-04 | Resolved: 2024-07-19

## 2024-07-19 PROBLEM — E53.8 VITAMIN B12 DEFICIENCY: Status: RESOLVED | Noted: 2023-03-04 | Resolved: 2024-07-19

## 2024-07-19 PROBLEM — L03.011 PARONYCHIA OF FINGER OF RIGHT HAND: Status: RESOLVED | Noted: 2023-12-01 | Resolved: 2024-07-19

## 2024-07-19 PROBLEM — N30.90 CYSTITIS: Status: RESOLVED | Noted: 2023-03-04 | Resolved: 2024-07-19

## 2024-07-19 PROBLEM — J90 PLEURAL EFFUSION: Status: RESOLVED | Noted: 2023-03-04 | Resolved: 2024-07-19

## 2024-07-19 PROBLEM — R07.9 CHEST PAIN: Status: RESOLVED | Noted: 2023-03-04 | Resolved: 2024-07-19

## 2024-07-19 PROBLEM — M62.830 BACK MUSCLE SPASM: Status: RESOLVED | Noted: 2023-03-04 | Resolved: 2024-07-19

## 2024-07-19 PROBLEM — R41.3 AMNESIA: Status: RESOLVED | Noted: 2023-03-04 | Resolved: 2024-07-19

## 2024-07-19 PROBLEM — N40.0 BPH WITHOUT URINARY OBSTRUCTION: Status: RESOLVED | Noted: 2023-03-04 | Resolved: 2024-07-19

## 2024-07-19 PROBLEM — R10.9 ABDOMINAL PAIN OF MULTIPLE SITES: Status: RESOLVED | Noted: 2023-03-04 | Resolved: 2024-07-19

## 2024-07-19 PROBLEM — R42 LIGHTHEADEDNESS: Status: RESOLVED | Noted: 2023-03-04 | Resolved: 2024-07-19

## 2024-07-19 PROBLEM — L25.5 RHUS DERMATITIS: Status: RESOLVED | Noted: 2023-03-04 | Resolved: 2024-07-19

## 2024-07-19 PROBLEM — R79.89 ELEVATED TSH: Status: RESOLVED | Noted: 2023-03-04 | Resolved: 2024-07-19

## 2024-07-19 PROBLEM — M79.10 MYALGIA: Status: RESOLVED | Noted: 2023-03-04 | Resolved: 2024-07-19

## 2024-07-19 PROBLEM — R09.81 NASAL CONGESTION: Status: RESOLVED | Noted: 2023-03-04 | Resolved: 2024-07-19

## 2024-07-19 PROBLEM — L84 PRE-ULCERATIVE CORN OR CALLOUS: Status: RESOLVED | Noted: 2023-03-04 | Resolved: 2024-07-19

## 2024-07-19 PROBLEM — E87.5 HYPERKALEMIA: Status: RESOLVED | Noted: 2023-03-04 | Resolved: 2024-07-19

## 2024-07-19 PROBLEM — G47.30 SLEEP APNEA: Status: RESOLVED | Noted: 2023-03-04 | Resolved: 2024-07-19

## 2024-07-19 PROCEDURE — 1170F FXNL STATUS ASSESSED: CPT | Performed by: FAMILY MEDICINE

## 2024-07-19 PROCEDURE — 1160F RVW MEDS BY RX/DR IN RCRD: CPT | Performed by: FAMILY MEDICINE

## 2024-07-19 PROCEDURE — 36415 COLL VENOUS BLD VENIPUNCTURE: CPT

## 2024-07-19 PROCEDURE — G0446 INTENS BEHAVE THER CARDIO DX: HCPCS | Performed by: FAMILY MEDICINE

## 2024-07-19 PROCEDURE — 1159F MED LIST DOCD IN RCRD: CPT | Performed by: FAMILY MEDICINE

## 2024-07-19 PROCEDURE — 99497 ADVNCD CARE PLAN 30 MIN: CPT | Performed by: FAMILY MEDICINE

## 2024-07-19 PROCEDURE — G0439 PPPS, SUBSEQ VISIT: HCPCS | Performed by: FAMILY MEDICINE

## 2024-07-19 PROCEDURE — 1123F ACP DISCUSS/DSCN MKR DOCD: CPT | Performed by: FAMILY MEDICINE

## 2024-07-19 PROCEDURE — 3074F SYST BP LT 130 MM HG: CPT | Performed by: FAMILY MEDICINE

## 2024-07-19 PROCEDURE — 99214 OFFICE O/P EST MOD 30 MIN: CPT | Performed by: FAMILY MEDICINE

## 2024-07-19 PROCEDURE — 83921 ORGANIC ACID SINGLE QUANT: CPT

## 2024-07-19 PROCEDURE — 1158F ADVNC CARE PLAN TLK DOCD: CPT | Performed by: FAMILY MEDICINE

## 2024-07-19 PROCEDURE — 3078F DIAST BP <80 MM HG: CPT | Performed by: FAMILY MEDICINE

## 2024-07-19 RX ORDER — TORSEMIDE 20 MG/1
1 TABLET ORAL
COMMUNITY
Start: 2023-10-13

## 2024-07-19 ASSESSMENT — ENCOUNTER SYMPTOMS
DIFFICULTY URINATING: 0
SHORTNESS OF BREATH: 0
SPEECH DIFFICULTY: 0
FREQUENCY: 0
VOMITING: 0
WOUND: 0
TROUBLE SWALLOWING: 0
ARTHRALGIAS: 0
FATIGUE: 0
UNEXPECTED WEIGHT CHANGE: 0
CONSTIPATION: 0
ACTIVITY CHANGE: 0
ABDOMINAL DISTENTION: 0
PALPITATIONS: 0
POLYDIPSIA: 0
NAUSEA: 0
SINUS PRESSURE: 0
CONFUSION: 0
MYALGIAS: 0
LOSS OF SENSATION IN FEET: 0
VOICE CHANGE: 0
HEADACHES: 0
HALLUCINATIONS: 0
LIGHT-HEADEDNESS: 0
SLEEP DISTURBANCE: 0
SEIZURES: 0
OCCASIONAL FEELINGS OF UNSTEADINESS: 0
FACIAL ASYMMETRY: 0
CHEST TIGHTNESS: 0
FLANK PAIN: 0
DIARRHEA: 0
DYSURIA: 0
BACK PAIN: 0
NERVOUS/ANXIOUS: 0
ANAL BLEEDING: 0
AGITATION: 0
SINUS PAIN: 0
CARDIOVASCULAR NEGATIVE: 1
CHILLS: 0
EYE DISCHARGE: 0
DIZZINESS: 0
APPETITE CHANGE: 0
BLOOD IN STOOL: 0
EYE REDNESS: 0
ADENOPATHY: 0
HYPERACTIVE: 0
DEPRESSION: 0
DECREASED CONCENTRATION: 0
NUMBNESS: 0
ABDOMINAL PAIN: 0
BRUISES/BLEEDS EASILY: 0
EYE PAIN: 0
RESPIRATORY NEGATIVE: 1
DIAPHORESIS: 0
GASTROINTESTINAL NEGATIVE: 1
EYE ITCHING: 0
SORE THROAT: 0

## 2024-07-19 ASSESSMENT — ACTIVITIES OF DAILY LIVING (ADL)
BATHING: INDEPENDENT
MANAGING_FINANCES: INDEPENDENT
DOING_HOUSEWORK: INDEPENDENT
GROCERY_SHOPPING: INDEPENDENT
TAKING_MEDICATION: INDEPENDENT
DRESSING: INDEPENDENT

## 2024-07-19 NOTE — PATIENT INSTRUCTIONS
Doing well.    Discussed all findings with you and daughter.    Please continue follow-up with hematology regarding anemia please continue follow-up with cardiology and with pulmonary on a regular basis.    Notes reviewed from them.  Labs have been reviewed medications reviewed and reconciled.  Please follow type II diet with a disabled cholesterol level

## 2024-07-19 NOTE — PROGRESS NOTES
Diabetic foot exam:   Left: Reflexes 1+    Vibratory sensation normal   Proprioception normal   Sharp/dull discrimination normal   Filament test present  Right: Reflexes 2+    Vibratory sensation normal   Proprioception normal   Sharp/dull discrimination normal   Filament test presentThe ASCVD Risk score (Dayne KIRAN, et al., 2019) failed to calculate for the following reasons:    The 2019 ASCVD risk score is only valid for ages 40 to 79    The patient has a prior MI or stroke diagnosis  Time spent was 10 mins reviewing  Advance Care Planning Note     Discussion Date: 07/19/24   Discussion Participants: patient    The patient wishes to discuss Advance Care Planning today and the following is a brief summary of our discussion.     Patient has capacity to make their own medical decisions: Yes  Health Care Agent/Surrogate Decision Maker documented in chart: Yes    Documents on file and valid:  Advance Directive/Living Will: No   Health Care Power of : No  Other: discussed and code status updated    Communication of Medical Status/Prognosis:   good    Communication of Treatment Goals/Options:   good    Treatment Decisions  Goals of Care: survival is prioritized, if goals for quality or survival can reasonably be achieved   agree  Follow Up Plan  Discuss next year  Team Members  PCP  Time Statement: Total face to face time spent on advance care planning was 16 minutes with 16 minutes spent in counseling, including the explanation.    Real Wright, [employer]   Patient ID: Nick Story is a 84 y.o. male who presents for Medicare Annual Wellness Visit Subsequent.    Doing well.         Alcohol intake: none  Caffeine intake: none  Exercise: none,  Going to the store    Last Colonoscopy: N/A  Last Pap smear: N/A  Mammogram:N/A  Last Dexa scan:N/A    Shingles vaccine: recommended  TdaP vaccine:     Review of Systems   Constitutional:  Negative for activity change, appetite change, chills, diaphoresis, fatigue  "and unexpected weight change.   HENT: Negative.  Negative for congestion, dental problem, ear discharge, ear pain, hearing loss, mouth sores, nosebleeds, postnasal drip, sinus pressure, sinus pain, sore throat, tinnitus, trouble swallowing and voice change.    Eyes:  Negative for pain, discharge, redness, itching and visual disturbance.   Respiratory: Negative.  Negative for chest tightness and shortness of breath.    Cardiovascular: Negative.  Negative for chest pain, palpitations and leg swelling.   Gastrointestinal: Negative.  Negative for abdominal distention, abdominal pain, anal bleeding, blood in stool, constipation, diarrhea, nausea and vomiting.   Endocrine: Negative for cold intolerance, heat intolerance, polydipsia and polyuria.   Genitourinary:  Positive for enuresis (1 every 2 hours). Negative for difficulty urinating, dysuria, flank pain, frequency and penile discharge.   Musculoskeletal:  Negative for arthralgias, back pain and myalgias.   Skin:  Negative for rash and wound.   Allergic/Immunologic: Negative for environmental allergies, food allergies and immunocompromised state.   Neurological:  Negative for dizziness, seizures, facial asymmetry, speech difficulty, light-headedness, numbness and headaches.   Hematological:  Negative for adenopathy. Does not bruise/bleed easily.   Psychiatric/Behavioral:  Negative for agitation, behavioral problems, confusion, decreased concentration, hallucinations, sleep disturbance and suicidal ideas. The patient is not nervous/anxious and is not hyperactive.        Objective   /69   Pulse 63   Ht 1.651 m (5' 5\")   Wt 81.8 kg (180 lb 6 oz)   SpO2 95%   BMI 30.02 kg/m²   BSA Body surface area is 1.94 meters squared.      Physical Exam  Constitutional:       Appearance: Normal appearance. He is obese.   HENT:      Head: Normocephalic and atraumatic.      Right Ear: Tympanic membrane normal.      Left Ear: Tympanic membrane normal.      Nose: Nose normal.    "   Mouth/Throat:      Mouth: Mucous membranes are dry.   Eyes:      Pupils: Pupils are equal, round, and reactive to light.   Cardiovascular:      Rate and Rhythm: Normal rate.      Heart sounds: Murmur heard.      Comments: Grade 2/6 systolic ejection murmur  Pulmonary:      Effort: Pulmonary effort is normal.      Comments: Decreased breath sounds in the bases of the lung  Abdominal:      General: Abdomen is flat. There is no distension.      Palpations: There is no mass.   Musculoskeletal:         General: Normal range of motion.      Cervical back: Normal range of motion.   Skin:     General: Skin is warm.   Neurological:      General: No focal deficit present.      Mental Status: He is alert.      Cranial Nerves: No cranial nerve deficit.   Psychiatric:         Mood and Affect: Mood normal.       Lab on 07/16/2024   Component Date Value Ref Range Status    WBC 07/16/2024 3.8 (L)  4.4 - 11.3 x10*3/uL Final    nRBC 07/16/2024 0.0  0.0 - 0.0 /100 WBCs Final    RBC 07/16/2024 2.90 (L)  4.50 - 5.90 x10*6/uL Final    Hemoglobin 07/16/2024 10.2 (L)  13.5 - 17.5 g/dL Final    Hematocrit 07/16/2024 33.8 (L)  41.0 - 52.0 % Final    MCV 07/16/2024 117 (H)  80 - 100 fL Final    MCH 07/16/2024 35.2 (H)  26.0 - 34.0 pg Final    MCHC 07/16/2024 30.2 (L)  32.0 - 36.0 g/dL Final    RDW 07/16/2024 17.7 (H)  11.5 - 14.5 % Final    Platelets 07/16/2024 157  150 - 450 x10*3/uL Final    Immature Granulocytes %, Automated 07/16/2024 0.3  0.0 - 0.9 % Final    Immature Granulocyte Count (IG) includes promyelocytes, myelocytes and metamyelocytes but does not include bands. Percent differential counts (%) should be interpreted in the context of the absolute cell counts (cells/UL).    Immature Granulocytes Absolute, Au* 07/16/2024 0.01  0.00 - 0.50 x10*3/uL Final    Glucose 07/16/2024 92  74 - 99 mg/dL Final    Sodium 07/16/2024 137  136 - 145 mmol/L Final    Potassium 07/16/2024 4.2  3.5 - 5.3 mmol/L Final    Chloride 07/16/2024 104  98 -  107 mmol/L Final    Bicarbonate 07/16/2024 27  21 - 32 mmol/L Final    Anion Gap 07/16/2024 10  10 - 20 mmol/L Final    Urea Nitrogen 07/16/2024 30 (H)  6 - 23 mg/dL Final    Creatinine 07/16/2024 1.42 (H)  0.50 - 1.30 mg/dL Final    eGFR 07/16/2024 48 (L)  >60 mL/min/1.73m*2 Final    Calculations of estimated GFR are performed using the 2021 CKD-EPI Study Refit equation without the race variable for the IDMS-Traceable creatinine methods.  https://jasn.asnjournals.org/content/early/2021/09/22/ASN.2708919947    Calcium 07/16/2024 8.7  8.6 - 10.6 mg/dL Final    Albumin 07/16/2024 3.9  3.4 - 5.0 g/dL Final    Alkaline Phosphatase 07/16/2024 91  33 - 136 U/L Final    Total Protein 07/16/2024 7.4  6.4 - 8.2 g/dL Final    AST 07/16/2024 21  9 - 39 U/L Final    Bilirubin, Total 07/16/2024 1.1  0.0 - 1.2 mg/dL Final    ALT 07/16/2024 13  10 - 52 U/L Final    Patients treated with Sulfasalazine may generate falsely decreased results for ALT.    Cholesterol 07/16/2024 174  0 - 199 mg/dL Final          Age      Desirable   Borderline High   High     0-19 Y     0 - 169       170 - 199     >/= 200    20-24 Y     0 - 189       190 - 224     >/= 225         >24 Y     0 - 199       200 - 239     >/= 240   **All ranges are based on fasting samples. Specific   therapeutic targets will vary based on patient-specific   cardiac risk.    Pediatric guidelines reference:Pediatrics 2011, 128(S5).Adult guidelines reference: NCEP ATPIII Guidelines,LARUEANO 2001, 258:2486-97    Venipuncture immediately after or during the administration of Metamizole may lead to falsely low results. Testing should be performed immediately prior to Metamizole dosing.    HDL-Cholesterol 07/16/2024 50.4  mg/dL Final      Age       Very Low   Low     Normal    High    0-19 Y    < 35      < 40     40-45     ----  20-24 Y    ----     < 40      >45      ----        >24 Y      ----     < 40     40-60      >60      Cholesterol/HDL Ratio 07/16/2024 3.5   Final      Ref  Values  Desirable  < 3.4  High Risk  > 5.0    LDL Calculated 07/16/2024 105 (H)  <=99 mg/dL Final                                Near   Borderline      AGE      Desirable  Optimal    High     High     Very High     0-19 Y     0 - 109     ---    110-129   >/= 130     ----    20-24 Y     0 - 119     ---    120-159   >/= 160     ----      >24 Y     0 -  99   100-129  130-159   160-189     >/=190      VLDL 07/16/2024 18  0 - 40 mg/dL Final    Triglycerides 07/16/2024 92  0 - 149 mg/dL Final       Age         Desirable   Borderline High   High     Very High   0 D-90 D    19 - 174         ----         ----        ----  91 D- 9 Y     0 -  74        75 -  99     >/= 100      ----    10-19 Y     0 -  89        90 - 129     >/= 130      ----    20-24 Y     0 - 114       115 - 149     >/= 150      ----         >24 Y     0 - 149       150 - 199    200- 499    >/= 500    Venipuncture immediately after or during the administration of Metamizole may lead to falsely low results. Testing should be performed immediately prior to Metamizole dosing.    Non HDL Cholesterol 07/16/2024 124  0 - 149 mg/dL Final          Age       Desirable   Borderline High   High     Very High     0-19 Y     0 - 119       120 - 144     >/= 145    >/= 160    20-24 Y     0 - 149       150 - 189     >/= 190      ----         >24 Y    30 mg/dL above LDL Cholesterol goal      Thyroid Stimulating Hormone 07/16/2024 2.30  0.44 - 3.98 mIU/L Final    Prostate Specific Antigen,Screen 07/16/2024 0.64  <=4.00 ng/mL Final    Pathologist Review-CBC Differential 07/16/2024 Macrocytic anemia with no mild anisopoikilocytosis.   Final    Electronically signed out by Alexander Harmon MD on 7/17/24 at 2:28 PM.  By the signature on this report, the individual or group listed as making the Final Interpretation/Diagnosis certifies that they have reviewed this case.    Neutrophils %, Manual 07/16/2024 82.1  40.0 - 80.0 % Final    Percent differential counts (%) should be  interpreted in the context of the absolute cell counts (cells/uL).    Lymphocytes %, Manual 07/16/2024 11.6  13.0 - 44.0 % Final    Monocytes %, Manual 07/16/2024 3.6  2.0 - 10.0 % Final    Eosinophils %, Manual 07/16/2024 2.7  0.0 - 6.0 % Final    Basophils %, Manual 07/16/2024 0.0  0.0 - 2.0 % Final    Seg Neutrophils Absolute, Manual 07/16/2024 3.12  1.60 - 5.00 x10*3/uL Final    Lymphocytes Absolute, Manual 07/16/2024 0.44 (L)  0.80 - 3.00 x10*3/uL Final    Monocytes Absolute, Manual 07/16/2024 0.14  0.05 - 0.80 x10*3/uL Final    Eosinophils Absolute, Manual 07/16/2024 0.10  0.00 - 0.40 x10*3/uL Final    Basophils Absolute, Manual 07/16/2024 0.00  0.00 - 0.10 x10*3/uL Final    Total Cells Counted 07/16/2024 112   Final    RBC Morphology 07/16/2024 See Below   Final    Ovalocytes 07/16/2024 Few   Final    Vitamin B12 07/16/2024 378  211 - 911 pg/mL Final    Iron 07/16/2024 112  35 - 150 ug/dL Final    UIBC 07/16/2024 196  110 - 370 ug/dL Final    TIBC 07/16/2024 308  240 - 445 ug/dL Final    % Saturation 07/16/2024 36  25 - 45 % Final    Ferritin 07/16/2024 217  20 - 300 ng/mL Final     Current Outpatient Medications on File Prior to Visit   Medication Sig Dispense Refill    aspirin 81 mg EC tablet Take 1 tablet (81 mg) by mouth once daily.      atorvastatin (Lipitor) 80 mg tablet TAKE 1 TABLET BY MOUTH ONCE  DAILY AT BEDTIME 90 tablet 3    cholecalciferol (Vitamin D-3) 50 mcg (2,000 unit) capsule Take 2 capsules (100 mcg) by mouth. TAKE 2 CAPSULE Once      cilostazol (Pletal) 100 mg tablet Take 1 tablet (100 mg) by mouth 2 times a day.      clopidogrel (Plavix) 75 mg tablet TAKE 1 TABLET BY MOUTH ONCE  DAILY 90 tablet 3    losartan (Cozaar) 50 mg tablet Half tablet daily 90 tablet 1    magnesium oxide (Mag-Ox) 400 mg (241.3 mg magnesium) tablet Take 1 tablet (400 mg) by mouth.      metoprolol tartrate (Lopressor) 25 mg tablet TAKE 1 TABLET BY MOUTH TWICE  DAILY 180 tablet 3    multivitamin tablet Take 1 tablet  by mouth once daily.      nitroglycerin (Nitrostat) 0.4 mg SL tablet Place 1 tablet (0.4 mg) under the tongue. DISSOLVE 1 TABLET UNDER THE TONGUE AS NEEDED FOR ANGINA      OneTouch Ultra Test strip 1 strip  in the morning and 1 strip before bedtime. TEST TWO TIMES A DAY.      pioglitazone (Actos) 30 mg tablet TAKE 1 TABLET BY MOUTH ONCE  DAILY 90 tablet 3    tiZANidine (Zanaflex) 2 mg tablet Take 1-2 tablets (2-4 mg) by mouth every 8 hours if needed for muscle spasms.      torsemide (Demadex) 20 mg tablet Take 1 tablet (20 mg) by mouth early in the morning..      albuterol 90 mcg/actuation inhaler Inhale 1-2 puffs. INHALE 1 TO 2 PUFFS EVERY 4 TO 6 HOURS AS NEEDED.      furosemide (Lasix) 40 mg tablet Take 1 tablet (40 mg) by mouth once daily in the morning. Take before meals.       No current facility-administered medications on file prior to visit.     No images are attached to the encounter.            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 Connors         Hyperlipidemia E78.5     Please follow type II diet closely and recheck lipids in 3         Hypertension I10     Blood pressure well-controlled         Mitral valve regurgitation I34.0     Following up with Dr. Connors         Encounter for annual wellness visit (AWV) in Medicare patient - Primary Z00.00    Anemia D64.9     This has been stable.  Continue follow-up with hematology on a regular basis

## 2024-07-21 PROBLEM — I50.32 CHRONIC HEART FAILURE WITH PRESERVED EJECTION FRACTION (MULTI): Status: ACTIVE | Noted: 2024-07-21

## 2024-07-21 PROBLEM — I73.9 CLAUDICATION OF LOWER EXTREMITY (CMS-HCC): Status: RESOLVED | Noted: 2023-03-04 | Resolved: 2024-07-21

## 2024-07-21 PROBLEM — Z93.8: Status: ACTIVE | Noted: 2024-07-21

## 2024-07-24 LAB — METHYLMALONATE SERPL-SCNC: 0.39 UMOL/L (ref 0–0.4)

## 2024-11-06 DIAGNOSIS — E11.40 CONTROLLED TYPE 2 DIABETES MELLITUS WITH DIABETIC NEUROPATHY, WITHOUT LONG-TERM CURRENT USE OF INSULIN: ICD-10-CM

## 2024-11-07 RX ORDER — PIOGLITAZONEHYDROCHLORIDE 30 MG/1
30 TABLET ORAL DAILY
Qty: 90 TABLET | Refills: 3 | Status: SHIPPED | OUTPATIENT
Start: 2024-11-07

## 2024-12-10 DIAGNOSIS — R06.09 DYSPNEA ON EXERTION: ICD-10-CM

## 2024-12-10 DIAGNOSIS — S39.012A STRAIN OF LUMBAR REGION, INITIAL ENCOUNTER: ICD-10-CM

## 2024-12-10 PROBLEM — R06.02 SHORTNESS OF BREATH: Status: ACTIVE | Noted: 2023-03-04

## 2025-01-08 DIAGNOSIS — I10 HYPERTENSION, UNSPECIFIED TYPE: ICD-10-CM

## 2025-01-09 RX ORDER — LOSARTAN POTASSIUM 50 MG/1
25 TABLET ORAL DAILY
Qty: 45 TABLET | Refills: 3 | Status: SHIPPED | OUTPATIENT
Start: 2025-01-09

## 2025-03-10 DIAGNOSIS — G45.9 TIA (TRANSIENT ISCHEMIC ATTACK): ICD-10-CM

## 2025-03-10 RX ORDER — CLOPIDOGREL BISULFATE 75 MG/1
TABLET ORAL
Qty: 90 TABLET | Refills: 3 | Status: SHIPPED | OUTPATIENT
Start: 2025-03-10

## 2025-04-04 ENCOUNTER — TELEPHONE (OUTPATIENT)
Dept: PRIMARY CARE | Facility: CLINIC | Age: 85
End: 2025-04-04
Payer: MEDICARE

## 2025-04-04 NOTE — TELEPHONE ENCOUNTER
[facility] calling to let you know that patient is in the hospital. He went in for really bad back pain. He then had pneumonia. He had mri for his back done which said something about myeloma. Paz would like you to review the results and advice on what to do next.  [facility]az #[phone]

## 2025-04-28 ENCOUNTER — APPOINTMENT (OUTPATIENT)
Dept: PRIMARY CARE | Facility: CLINIC | Age: 85
End: 2025-04-28
Payer: MEDICARE

## 2025-05-05 ENCOUNTER — TELEPHONE (OUTPATIENT)
Dept: PRIMARY CARE | Facility: CLINIC | Age: 85
End: 2025-05-05
Payer: MEDICARE

## 2025-05-05 NOTE — TELEPHONE ENCOUNTER
Zina from [facility] called to get a verbal for skilled nursing 1 time weekly for 5 weeks.    Zina [phone]

## 2025-05-09 ENCOUNTER — APPOINTMENT (OUTPATIENT)
Dept: PRIMARY CARE | Facility: CLINIC | Age: 85
End: 2025-05-09
Payer: MEDICARE

## 2025-05-09 VITALS
TEMPERATURE: 97.7 F | WEIGHT: 170 LBS | OXYGEN SATURATION: 96 % | HEART RATE: 91 BPM | DIASTOLIC BLOOD PRESSURE: 82 MMHG | SYSTOLIC BLOOD PRESSURE: 131 MMHG | BODY MASS INDEX: 28.29 KG/M2

## 2025-05-09 DIAGNOSIS — K40.90 UNILATERAL INGUINAL HERNIA WITHOUT OBSTRUCTION OR GANGRENE, RECURRENCE NOT SPECIFIED: ICD-10-CM

## 2025-05-09 DIAGNOSIS — E11.22 CKD STAGE 3 SECONDARY TO DIABETES (MULTI): ICD-10-CM

## 2025-05-09 DIAGNOSIS — S32.010A: ICD-10-CM

## 2025-05-09 DIAGNOSIS — S32.000D COMPRESSION FRACTURE OF LUMBAR VERTEBRA WITH ROUTINE HEALING, UNSPECIFIED LUMBAR VERTEBRAL LEVEL, SUBSEQUENT ENCOUNTER: Primary | ICD-10-CM

## 2025-05-09 DIAGNOSIS — S32.050D COMPRESSION FRACTURE OF L5 VERTEBRA WITH ROUTINE HEALING, SUBSEQUENT ENCOUNTER: ICD-10-CM
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
## 2025-05-09 DIAGNOSIS — N18.30 CKD STAGE 3 SECONDARY TO DIABETES (MULTI): ICD-10-CM

## 2025-05-09 DIAGNOSIS — D64.9 ANEMIA, UNSPECIFIED TYPE: ICD-10-CM

## 2025-05-09 PROCEDURE — 3075F SYST BP GE 130 - 139MM HG: CPT | Performed by: FAMILY MEDICINE

## 2025-05-09 PROCEDURE — 1159F MED LIST DOCD IN RCRD: CPT | Performed by: FAMILY MEDICINE

## 2025-05-09 PROCEDURE — 3078F DIAST BP <80 MM HG: CPT | Performed by: FAMILY MEDICINE

## 2025-05-09 PROCEDURE — 99214 OFFICE O/P EST MOD 30 MIN: CPT | Performed by: FAMILY MEDICINE

## 2025-05-09 PROCEDURE — 1123F ACP DISCUSS/DSCN MKR DOCD: CPT | Performed by: FAMILY MEDICINE

## 2025-05-09 ASSESSMENT — ENCOUNTER SYMPTOMS
COUGH: 1
CHILLS: 0
SHORTNESS OF BREATH: 0
LOSS OF SENSATION IN FEET: 0
WHEEZING: 0
FATIGUE: 0
DEPRESSION: 0
DYSURIA: 0
CHEST TIGHTNESS: 0
ACTIVITY CHANGE: 0
EYE PAIN: 0
OCCASIONAL FEELINGS OF UNSTEADINESS: 0
MYALGIAS: 0
CARDIOVASCULAR NEGATIVE: 1
BACK PAIN: 0

## 2025-05-09 ASSESSMENT — PATIENT HEALTH QUESTIONNAIRE - PHQ9
SUM OF ALL RESPONSES TO PHQ9 QUESTIONS 1 AND 2: 0
1. LITTLE INTEREST OR PLEASURE IN DOING THINGS: NOT AT ALL
10. IF YOU CHECKED OFF ANY PROBLEMS, HOW DIFFICULT HAVE THESE PROBLEMS MADE IT FOR YOU TO DO YOUR WORK, TAKE CARE OF THINGS AT HOME, OR GET ALONG WITH OTHER PEOPLE: NOT DIFFICULT AT ALL
2. FEELING DOWN, DEPRESSED OR HOPELESS: NOT AT ALL

## 2025-05-09 NOTE — PROGRESS NOTES
Subjective   Patient ID: Nick Story is a 85 y.o. male who presents for Hospital Follow-up (compression fracture).    R inguinal pain.    R lower back not.   PT at home.  Next am. Pain in lower back.   Sleep on the back.     patient presents for follow-up patient with history of pneumonia patient with bilateral lower lobe infiltrates edema versus pneumonia patient with small pleural effusion and hypertension.  Patient was on cefdinir stopped it was 411 doxycycline 100 mg 1 twice a day.  Severe lumbar stenosis.    Patient with chronic disease    Chronic kidney disease history of pneumonia CT spine revealed several foraminal areas of stenosis MRI showed chronic compression fracture L3-L4 L4-L5 with acute L1 compression fractures without retropulsion.  Conservative management pain management be performed PT and OT.    Patient had muscle spasms not controlled by Robaxin trial of Zanaflex had hypotension and improved with Gatorade.    Patient 1 dose of Toradol with good result losartan have been held.  The      Review of Systems   Constitutional:  Negative for activity change, chills and fatigue.   HENT:  Negative for dental problem.    Eyes:  Negative for pain.   Respiratory:  Positive for cough. Negative for chest tightness, shortness of breath and wheezing.    Cardiovascular: Negative.  Negative for leg swelling.   Genitourinary:  Negative for dysuria and penile discharge.   Musculoskeletal:  Negative for back pain and myalgias.       Objective   /82   Pulse 91   Temp 36.5 °C (97.7 °F)   Wt 77.1 kg (170 lb)   SpO2 96%   BMI 28.29 kg/m²   BSA Body surface area is 1.88 meters squared.      Physical Exam  Constitutional:       General: He is not in acute distress.     Appearance: Normal appearance. He is not ill-appearing, toxic-appearing or diaphoretic.   HENT:      Head: Normocephalic.   Cardiovascular:      Rate and Rhythm: Normal rate and regular rhythm.      Pulses: Normal pulses.      Heart sounds:  Normal heart sounds.   Pulmonary:      Effort: No respiratory distress.      Breath sounds: No stridor.   Abdominal:      General: Abdomen is flat.      Palpations: Abdomen is soft.      Hernia: A hernia is present.   Musculoskeletal:      Cervical back: No rigidity.   Lymphadenopathy:      Cervical: No cervical adenopathy.   Neurological:      General: No focal deficit present.      Mental Status: He is alert.   Psychiatric:         Mood and Affect: Mood normal.       Lab on 07/19/2024   Component Date Value Ref Range Status    Methylmalonic Acid, S 07/19/2024 0.39  0.00 - 0.40 umol/L Final    INTERPRETIVE INFORMATION: MMA Serum/Plasma,                             Vitamin B12 Status    This test was developed and its performance characteristics   determined by [facility]. It has not been cleared or   approved by the US Food and Drug Administration. This test was   performed in a CLIA certified laboratory and is intended for   clinical purposes.  Performed By: [facility]  [address] [place] [id number]  : Shai Avery MD, PhD  CLIA Number: [id number]   Lab on 07/16/2024   Component Date Value Ref Range Status    WBC 07/16/2024 3.8 (L)  4.4 - 11.3 x10*3/uL Final    nRBC 07/16/2024 0.0  0.0 - 0.0 /100 WBCs Final    RBC 07/16/2024 2.90 (L)  4.50 - 5.90 x10*6/uL Final    Hemoglobin 07/16/2024 10.2 (L)  13.5 - 17.5 g/dL Final    Hematocrit 07/16/2024 33.8 (L)  41.0 - 52.0 % Final    MCV 07/16/2024 117 (H)  80 - 100 fL Final    MCH 07/16/2024 35.2 (H)  26.0 - 34.0 pg Final    MCHC 07/16/2024 30.2 (L)  32.0 - 36.0 g/dL Final    RDW 07/16/2024 17.7 (H)  11.5 - 14.5 % Final    Platelets 07/16/2024 157  150 - 450 x10*3/uL Final    Immature Granulocytes %, Automated 07/16/2024 0.3  0.0 - 0.9 % Final    Immature Granulocyte Count (IG) includes promyelocytes, myelocytes and metamyelocytes but does not include bands. Percent differential counts (%) should be interpreted in the  context of the absolute cell counts (cells/UL).    Immature Granulocytes Absolute, Au* 07/16/2024 0.01  0.00 - 0.50 x10*3/uL Final    Glucose 07/16/2024 92  74 - 99 mg/dL Final    Sodium 07/16/2024 137  136 - 145 mmol/L Final    Potassium 07/16/2024 4.2  3.5 - 5.3 mmol/L Final    Chloride 07/16/2024 104  98 - 107 mmol/L Final    Bicarbonate 07/16/2024 27  21 - 32 mmol/L Final    Anion Gap 07/16/2024 10  10 - 20 mmol/L Final    Urea Nitrogen 07/16/2024 30 (H)  6 - 23 mg/dL Final    Creatinine 07/16/2024 1.42 (H)  0.50 - 1.30 mg/dL Final    eGFR 07/16/2024 48 (L)  >60 mL/min/1.73m*2 Final    Calculations of estimated GFR are performed using the 2021 CKD-EPI Study Refit equation without the race variable for the IDMS-Traceable creatinine methods.  https://jasn.asnjournals.org/content/early/2021/09/22/ASN.2602506564    Calcium 07/16/2024 8.7  8.6 - 10.6 mg/dL Final    Albumin 07/16/2024 3.9  3.4 - 5.0 g/dL Final    Alkaline Phosphatase 07/16/2024 91  33 - 136 U/L Final    Total Protein 07/16/2024 7.4  6.4 - 8.2 g/dL Final    AST 07/16/2024 21  9 - 39 U/L Final    Bilirubin, Total 07/16/2024 1.1  0.0 - 1.2 mg/dL Final    ALT 07/16/2024 13  10 - 52 U/L Final    Patients treated with Sulfasalazine may generate falsely decreased results for ALT.    Cholesterol 07/16/2024 174  0 - 199 mg/dL Final          Age      Desirable   Borderline High   High     0-19 Y     0 - 169       170 - 199     >/= 200    20-24 Y     0 - 189       190 - 224     >/= 225         >24 Y     0 - 199       200 - 239     >/= 240   **All ranges are based on fasting samples. Specific   therapeutic targets will vary based on patient-specific   cardiac risk.    Pediatric guidelines reference:Pediatrics 2011, 128(S5).Adult guidelines reference: NCEP ATPIII Guidelines,LAUREANO 2001, 258:2486-97    Venipuncture immediately after or during the administration of Metamizole may lead to falsely low results. Testing should be performed immediately prior to Metamizole  dosing.    HDL-Cholesterol 07/16/2024 50.4  mg/dL Final      Age       Very Low   Low     Normal    High    0-19 Y    < 35      < 40     40-45     ----  20-24 Y    ----     < 40      >45      ----        >24 Y      ----     < 40     40-60      >60      Cholesterol/HDL Ratio 07/16/2024 3.5   Final      Ref Values  Desirable  < 3.4  High Risk  > 5.0    LDL Calculated 07/16/2024 105 (H)  <=99 mg/dL Final                                Near   Borderline      AGE      Desirable  Optimal    High     High     Very High     0-19 Y     0 - 109     ---    110-129   >/= 130     ----    20-24 Y     0 - 119     ---    120-159   >/= 160     ----      >24 Y     0 -  99   100-129  130-159   160-189     >/=190      VLDL 07/16/2024 18  0 - 40 mg/dL Final    Triglycerides 07/16/2024 92  0 - 149 mg/dL Final       Age         Desirable   Borderline High   High     Very High   0 D-90 D    19 - 174         ----         ----        ----  91 D- 9 Y     0 -  74        75 -  99     >/= 100      ----    10-19 Y     0 -  89        90 - 129     >/= 130      ----    20-24 Y     0 - 114       115 - 149     >/= 150      ----         >24 Y     0 - 149       150 - 199    200- 499    >/= 500    Venipuncture immediately after or during the administration of Metamizole may lead to falsely low results. Testing should be performed immediately prior to Metamizole dosing.    Non HDL Cholesterol 07/16/2024 124  0 - 149 mg/dL Final          Age       Desirable   Borderline High   High     Very High     0-19 Y     0 - 119       120 - 144     >/= 145    >/= 160    20-24 Y     0 - 149       150 - 189     >/= 190      ----         >24 Y    30 mg/dL above LDL Cholesterol goal      Thyroid Stimulating Hormone 07/16/2024 2.30  0.44 - 3.98 mIU/L Final    Prostate Specific Antigen,Screen 07/16/2024 0.64  <=4.00 ng/mL Final    Pathologist Review-CBC Differential 07/16/2024 Macrocytic anemia with no mild anisopoikilocytosis.   Final    Electronically signed out by Alexander  JACQUES Harmon MD on 7/17/24 at 2:28 PM.  By the signature on this report, the individual or group listed as making the Final Interpretation/Diagnosis certifies that they have reviewed this case.    Neutrophils %, Manual 07/16/2024 82.1  40.0 - 80.0 % Final    Percent differential counts (%) should be interpreted in the context of the absolute cell counts (cells/uL).    Lymphocytes %, Manual 07/16/2024 11.6  13.0 - 44.0 % Final    Monocytes %, Manual 07/16/2024 3.6  2.0 - 10.0 % Final    Eosinophils %, Manual 07/16/2024 2.7  0.0 - 6.0 % Final    Basophils %, Manual 07/16/2024 0.0  0.0 - 2.0 % Final    Seg Neutrophils Absolute, Manual 07/16/2024 3.12  1.60 - 5.00 x10*3/uL Final    Lymphocytes Absolute, Manual 07/16/2024 0.44 (L)  0.80 - 3.00 x10*3/uL Final    Monocytes Absolute, Manual 07/16/2024 0.14  0.05 - 0.80 x10*3/uL Final    Eosinophils Absolute, Manual 07/16/2024 0.10  0.00 - 0.40 x10*3/uL Final    Basophils Absolute, Manual 07/16/2024 0.00  0.00 - 0.10 x10*3/uL Final    Total Cells Counted 07/16/2024 112   Final    RBC Morphology 07/16/2024 See Below   Final    Ovalocytes 07/16/2024 Few   Final    Vitamin B12 07/16/2024 378  211 - 911 pg/mL Final    Iron 07/16/2024 112  35 - 150 ug/dL Final    UIBC 07/16/2024 196  110 - 370 ug/dL Final    TIBC 07/16/2024 308  240 - 445 ug/dL Final    % Saturation 07/16/2024 36  25 - 45 % Final    Ferritin 07/16/2024 217  20 - 300 ng/mL Final     Medications Ordered Prior to Encounter[1]  No images are attached to the encounter.            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    This has been stable continuing to follow         Anemia D64.9    Rechecking CBC         Relevant Orders    CBC and Auto Differential    Compression fracture of L1 lumbar vertebra (Multi) - Primary S32.010A    Referring to orthopedic back specialist         Relevant Orders    XR DEXA bone density     Unilateral inguinal hernia without obstruction or gangrene K40.90    Referring to the general surgeon         Relevant Orders    Referral to General Surgery     Other Visit Diagnoses         Codes      Injury due to fall, initial encounter     W19.XXXA    Relevant Orders    XR DEXA bone density                      [1]   Current Outpatient Medications on File Prior to Visit   Medication Sig Dispense Refill    aspirin 81 mg EC tablet Take 1 tablet (81 mg) by mouth once daily.      cholecalciferol (Vitamin D-3) 50 mcg (2,000 unit) capsule Take 2 capsules (100 mcg) by mouth. TAKE 2 CAPSULE Once      cilostazol (Pletal) 100 mg tablet Take 1 tablet (100 mg) by mouth 2 times a day.      clopidogrel (Plavix) 75 mg tablet TAKE 1 TABLET BY MOUTH ONCE  DAILY 90 tablet 3    magnesium oxide (Mag-Ox) 400 mg (241.3 mg magnesium) tablet Take 1 tablet (400 mg) by mouth.      metoprolol tartrate (Lopressor) 25 mg tablet TAKE 1 TABLET BY MOUTH TWICE  DAILY 180 tablet 3    nitroglycerin (Nitrostat) 0.4 mg SL tablet Place 1 tablet (0.4 mg) under the tongue. DISSOLVE 1 TABLET UNDER THE TONGUE AS NEEDED FOR ANGINA      OneTouch Ultra Test strip 1 strip  in the morning and 1 strip before bedtime. TEST TWO TIMES A DAY.      pioglitazone (Actos) 30 mg tablet TAKE 1 TABLET BY MOUTH ONCE  DAILY 90 tablet 3    torsemide (Demadex) 20 mg tablet Take 1 tablet (20 mg) by mouth early in the morning..      [DISCONTINUED] atorvastatin (Lipitor) 80 mg tablet TAKE 1 TABLET BY MOUTH ONCE  DAILY AT BEDTIME 90 tablet 3    [DISCONTINUED] albuterol 90 mcg/actuation inhaler Inhale 1-2 puffs. INHALE 1 TO 2 PUFFS EVERY 4 TO 6 HOURS AS NEEDED.      [DISCONTINUED] furosemide (Lasix) 40 mg tablet Take 1 tablet (40 mg) by mouth once daily in the morning. Take before meals.      [DISCONTINUED] losartan (Cozaar) 50 mg tablet TAKE ONE-HALF TABLET BY MOUTH  DAILY 45 tablet 3    [DISCONTINUED] multivitamin tablet Take 1 tablet by mouth once daily.      [DISCONTINUED]  tiZANidine (Zanaflex) 2 mg tablet Take 1-2 tablets (2-4 mg) by mouth every 8 hours if needed for muscle spasms.       No current facility-administered medications on file prior to visit.

## 2025-05-09 NOTE — PATIENT INSTRUCTIONS
Going to have you see back specialist for compression fracture.  Can have you see Dr. Dunbar.    Ordering bone density study.    Going to have you see Dr. Yasmani Calderon general surgery for inguinal hernia.    Going to check CBC with differential to follow anemia.    I do recommend following up with hematology specialist,    All things were discussed with you and your daughter

## 2025-05-12 DIAGNOSIS — E78.5 HYPERLIPIDEMIA, UNSPECIFIED HYPERLIPIDEMIA TYPE: ICD-10-CM

## 2025-05-12 RX ORDER — ATORVASTATIN CALCIUM 80 MG/1
80 TABLET, FILM COATED ORAL NIGHTLY
Qty: 90 TABLET | Refills: 3 | Status: SHIPPED | OUTPATIENT
Start: 2025-05-12

## 2025-05-13 ENCOUNTER — TELEPHONE (OUTPATIENT)
Dept: PRIMARY CARE | Facility: CLINIC | Age: 85
End: 2025-05-13
Payer: MEDICARE

## 2025-05-13 PROBLEM — S32.010A COMPRESSION FRACTURE OF L1 LUMBAR VERTEBRA (MULTI): Status: ACTIVE | Noted: 2025-05-09

## 2025-05-13 PROBLEM — K40.90 UNILATERAL INGUINAL HERNIA WITHOUT OBSTRUCTION OR GANGRENE: Status: ACTIVE | Noted: 2025-05-13

## 2025-05-13 NOTE — TELEPHONE ENCOUNTER
Luis from [facility] called and requested an order for OT they would like to continue OT @ once a week for 4 week. His cell is [phone].

## 2025-05-14 ENCOUNTER — TELEPHONE (OUTPATIENT)
Dept: PRIMARY CARE | Facility: CLINIC | Age: 85
End: 2025-05-14
Payer: MEDICARE

## 2025-05-14 NOTE — TELEPHONE ENCOUNTER
Pts daughter called following a hiatal hernia she wants to make sure that Dr. Wright agrees and if you could help with scheduling the initial appt will not work because the provider does not specialize with those type of hernias?   Can you give her a call.     Kelsie  [phone]

## 2025-05-30 ENCOUNTER — TELEPHONE (OUTPATIENT)
Dept: PRIMARY CARE | Facility: CLINIC | Age: 85
End: 2025-05-30
Payer: MEDICARE

## 2025-05-30 NOTE — TELEPHONE ENCOUNTER
Nolvia Thomas OT from [facility] left message that pt missed his OT appointment due to possible hernia. She will be going out next week.  Nolvia #[phone]

## 2025-06-04 DIAGNOSIS — I10 PRIMARY HYPERTENSION: ICD-10-CM

## 2025-06-04 RX ORDER — METOPROLOL TARTRATE 25 MG/1
25 TABLET, FILM COATED ORAL 2 TIMES DAILY
Qty: 180 TABLET | Refills: 3 | Status: SHIPPED | OUTPATIENT
Start: 2025-06-04

## 2025-06-11 ENCOUNTER — TELEPHONE (OUTPATIENT)
Dept: PRIMARY CARE | Facility: CLINIC | Age: 85
End: 2025-06-11

## 2025-06-11 NOTE — TELEPHONE ENCOUNTER
Patient was re-evaluated for home health occupational therapy- they are going to continue care once a week for 3 weeks.

## 2025-07-07 ENCOUNTER — TELEPHONE (OUTPATIENT)
Dept: PRIMARY CARE | Facility: CLINIC | Age: 85
End: 2025-07-07
Payer: MEDICARE

## 2025-07-07 NOTE — TELEPHONE ENCOUNTER
Zina from [facility] calling to let ou know that pt is discharged from home care services and all goals have been met.  Zina #[phone]